# Patient Record
Sex: MALE | Race: BLACK OR AFRICAN AMERICAN | Employment: UNEMPLOYED | URBAN - METROPOLITAN AREA
[De-identification: names, ages, dates, MRNs, and addresses within clinical notes are randomized per-mention and may not be internally consistent; named-entity substitution may affect disease eponyms.]

---
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
## 2021-01-22 ENCOUNTER — OFFICE VISIT (OUTPATIENT)
Dept: FAMILY MEDICINE CLINIC | Age: 21
End: 2021-01-22
Payer: COMMERCIAL

## 2021-01-22 VITALS
BODY MASS INDEX: 24.06 KG/M2 | DIASTOLIC BLOOD PRESSURE: 70 MMHG | HEIGHT: 72 IN | SYSTOLIC BLOOD PRESSURE: 110 MMHG | OXYGEN SATURATION: 98 % | TEMPERATURE: 97.7 F | HEART RATE: 74 BPM | WEIGHT: 177.6 LBS

## 2021-01-22 DIAGNOSIS — F41.9 ANXIETY: Primary | ICD-10-CM

## 2021-01-22 DIAGNOSIS — L42 PITYRIASIS ROSEA: ICD-10-CM

## 2021-01-22 DIAGNOSIS — F43.22 ADJUSTMENT DISORDER WITH ANXIOUS MOOD: ICD-10-CM

## 2021-01-22 PROCEDURE — 99214 OFFICE O/P EST MOD 30 MIN: CPT | Performed by: FAMILY MEDICINE

## 2021-01-22 PROCEDURE — G8427 DOCREV CUR MEDS BY ELIG CLIN: HCPCS | Performed by: FAMILY MEDICINE

## 2021-01-22 PROCEDURE — G8420 CALC BMI NORM PARAMETERS: HCPCS | Performed by: FAMILY MEDICINE

## 2021-01-22 PROCEDURE — 1036F TOBACCO NON-USER: CPT | Performed by: FAMILY MEDICINE

## 2021-01-22 PROCEDURE — G8482 FLU IMMUNIZE ORDER/ADMIN: HCPCS | Performed by: FAMILY MEDICINE

## 2021-01-22 RX ORDER — FLUOXETINE 10 MG/1
10 CAPSULE ORAL DAILY
Qty: 30 CAPSULE | Refills: 3 | Status: SHIPPED | OUTPATIENT
Start: 2021-01-22 | End: 2021-06-07 | Stop reason: SDUPTHER

## 2021-01-22 ASSESSMENT — PATIENT HEALTH QUESTIONNAIRE - PHQ9
1. LITTLE INTEREST OR PLEASURE IN DOING THINGS: 0
SUM OF ALL RESPONSES TO PHQ QUESTIONS 1-9: 0
2. FEELING DOWN, DEPRESSED OR HOPELESS: 0
SUM OF ALL RESPONSES TO PHQ9 QUESTIONS 1 & 2: 0
SUM OF ALL RESPONSES TO PHQ QUESTIONS 1-9: 0

## 2021-01-22 NOTE — PROGRESS NOTES
Chief Complaint   Patient presents with    Skin Problem     Has spots on both shoulders that has been present for the past couple of weeks. Denies any itching or pain. Has not used any OTC products to area. HPI: Nolon Bitter Schildwachter 24 y.o. male presenting for        Rash on the body   Has been going on for acouple of weeks   Started on the right anterior chest that started spreading. Denies any scaling or itching   ROS negative   Denies anyone else that has it     Adjustment disorder/depression/anxiety  Patient reports he has been having depression, anxiety, stress lately after his godfather (mother's boyfriend) had passed away. Patient reports that he has been getting more stressed and has increased outbursts. Patient has been having a strained relationship with his girlfriend. Patient does admit to sad and depressed feelings but denies any suicidal homicidal ideations. Denies any auditory visual hallucinations. Patient has had a history of low moods depression for couple months now. Patient also has history of ADHD however is not on medication currently. Current Outpatient Medications   Medication Sig Dispense Refill    FLUoxetine (PROZAC) 10 MG capsule Take 1 capsule by mouth daily 30 capsule 3    SUMAtriptan (IMITREX) 25 MG tablet Take 1 tablet by mouth daily as needed for Migraine 9 tablet 2    naproxen (NAPROSYN) 500 MG tablet Take 1 tablet by mouth 2 times daily as needed for Pain Take with food 60 tablet 0    sodium chloride (OCEAN) 0.65 % nasal spray 1 spray by Nasal route as needed for Congestion 1 Bottle 3    EPINEPHrine (EPIPEN) 0.3 MG/0.3ML SOAJ injection Use as directed for allergic reaction 2 each 1     No current facility-administered medications for this visit. ROS  CONSTITUTIONAL: The patient denies fevers, chills, sweats and body ache.   HEENT: admits to  headache that have improved,  Denies blurry vision, eye pain, tinnitus, vertigo,  sore throat, neck or thyroid masses. Denies nasal congestion  RESPIRATORY: Denies cough, sputum, hemoptysis. CARDIAC: Denies chest pain, pressure, palpitations, Denies lower extremity edema. GASTROINTESTINAL: denies any abdominal pain. Denies any constipation or diarrhea   GENITOURINARY: Denies dysuria, hematuria, nocturia or frequency, urinary incontinence. NEUROLOGIC: Denies headaches, dizziness, syncope, weakness  MUSCULOSKELETAL: denies changes in range of motion, joint pain, stiffness. ENDOCRINOLOGY: Denies heat or cold intolerance.    HEMATOLOGY: Denies easy bleeding or blood transfusion,anemia  DERMATOLOGY: Admits to a rash  PSYCHIATRY: Admits to depression, anxiety, stress    Past Medical History:   Diagnosis Date    ADHD     Oppositional defiant disorder         Past Surgical History:   Procedure Laterality Date    ADENOIDECTOMY          Family History   Problem Relation Age of Onset    Stroke Father     Stroke Maternal Grandmother     Stroke Maternal Grandfather         Social History     Socioeconomic History    Marital status: Single     Spouse name: Not on file    Number of children: Not on file    Years of education: Not on file    Highest education level: Not on file   Occupational History    Occupation: high school   Social Needs    Financial resource strain: Not hard at all   [place] insecurity     Worry: Never true     Inability: Never true    Transportation needs     Medical: No     Non-medical: No   Tobacco Use    Smoking status: Never Smoker    Smokeless tobacco: Never Used   Substance and Sexual Activity    Alcohol use: Never     Frequency: Never    Drug use: Never    Sexual activity: Not on file   Lifestyle    Physical activity     Days per week: Not on file     Minutes per session: Not on file    Stress: Not on file   Relationships    Social connections     Talks on phone: Not on file     Gets together: Not on file     Attends Gnosticism service: Not on file     Active member of club or organization: Not on file     Attends meetings of clubs or organizations: Not on file     Relationship status: Not on file    Intimate partner violence     Fear of current or ex partner: Not on file     Emotionally abused: Not on file     Physically abused: Not on file     Forced sexual activity: Not on file   Other Topics Concern    Not on file   Social History Narrative    Not on file        /70 (Site: Right Upper Arm, Position: Sitting, Cuff Size: Medium Adult)   Pulse 74   Temp 97.7 °F (36.5 °C) (Oral)   Ht 5' 11.5\" (1.816 m)   Wt 177 lb 9.6 oz (80.6 kg)   SpO2 98%   BMI 24.42 kg/m²        Physical Exam:    General appearance - alert, well appearing, and in no distress  Mental Status - alert, oriented to person, place, and time  Eyes - pupils equal and reactive, extraocular eye movements intact   Ears - bilateral TM's and external ear canals normal   Nose - normal and patent, no erythema, discharge or polyps   Sinuses - Normal paranasal sinuses without tenderness   Throat - mucous membranes moist, pharynx normal without lesions   Neck - supple, no significant adenopathy   Thyroid - thyroid is normal in size without nodules or tenderness    Chest - clear to auscultation, no wheezes, rales or rhonchi, symmetric air entry   Heart - normal rate, regular rhythm, normal S1, S2, no murmurs, rubs, clicks or gallops  Abdomen - soft, nontender, nondistended, no masses or organomegaly   Back exam - full range of motion, no tenderness, palpable spasm or pain on motion   Neurological - alert, oriented, normal speech, no focal findings or movement disorder noted   Musculoskeletal - no joint tenderness, deformity or swelling   Extremities - peripheral pulses normal, no pedal edema, no clubbing or cyanosis   Skin -slightly erythematous raised papular papular rash.   [place] patch noted on the right anterior chest      Labs   No results found for: TSHREFLEX  TSH   Date Value Ref Range Status   07/29/2014 1.410 0.270 - 4.200 uIU/mL Final     Lab Results   Component Value Date     07/29/2014    K 4.4 07/29/2014     07/29/2014    CO2 27 07/29/2014    BUN 7 07/29/2014    CREATININE 0.53 (L) 07/29/2014    GLUCOSE 84 07/29/2014    CALCIUM 9.7 07/29/2014    PROT 6.6 07/29/2014    LABALBU 4.6 07/29/2014    BILITOT 0.8 07/29/2014    ALKPHOS 290 07/29/2014    AST 18 07/29/2014    ALT 10 07/29/2014    LABGLOM >60.0 07/29/2014    GFRAA >60.0 07/29/2014       Lab Results   Component Value Date    WBC 4.2 (L) 07/29/2014    HGB 14.0 07/29/2014    HCT 42.2 07/29/2014    MCV 91.1 07/29/2014     07/29/2014     Stool studies are negative. A/P: Marvene Hench Schildwachter 24 y.o. male presenting for    1. Anxiety  Will start patient on Prozac   - FLUoxetine (PROZAC) 10 MG capsule; Take 1 capsule by mouth daily  Dispense: 30 capsule; Refill: 3    2. Adjustment disorder with anxious mood    - FLUoxetine (PROZAC) 10 MG capsule; Take 1 capsule by mouth daily  Dispense: 30 capsule; Refill: 3    3. Pityriasis rosea  Advice given.

## 2021-01-22 NOTE — PATIENT INSTRUCTIONS
General advice  Bathe or shower with plain water and bath oil, aqueous cream, or another soap substitute. Apply moisturising creams to dry skin. Expose skin to sunlight cautiously (without burning). Patient Education        Pityriasis Rosea: Care Instructions  Your Care Instructions     Pityriasis rosea (say \"pit-uh-RY-uh-teofilo HADLEY-zee-uh\") is a common skin rash. It usually starts as one scaly, reddish-pink spot on your stomach or back. Days or weeks later, more spots appear. The rash may itch, but it will not spread to other people. No one knows what causes pityriasis rosea. Some doctors believe it is a reaction to a virus. Pityriasis rosea is most common in children and young adults. It lasts 1 to 3 months and then goes away on its own. Medicine can help relieve any itching. Follow-up care is a key part of your treatment and safety. Be sure to make and go to all appointments, and call your doctor if you are having problems. It's also a good idea to know your test results and keep a list of the medicines you take. How can you care for yourself at home? · Use your medicine exactly as prescribed. Call your doctor if you have any problems with your medicine. · Expose your skin to small amounts of sunlight, but avoid sunburn. Sunlight can lessen the rash. · Use a mild soap, such as Dove or Cetaphil, when you wash your skin. · Add a handful of oatmeal (ground to a powder) to your bath. Or you can try an oatmeal bath product, such as Aveeno. Keep the water warm or lukewarm. A hot bath or shower may make the rash more visible and itchy. · Use an over-the-counter 1% hydrocortisone cream for small itchy areas. When should you call for help? Call your doctor now or seek immediate medical care if:    · You have signs of infection such as:  ? Pain, warmth, or swelling near the rash. ? Red streaks near the rash. ? Pus coming from the rash. ? A fever.    Watch closely for changes in your health, and be sure to contact your doctor if:    · You see the rash on the palms of your hands or the soles of your feet.     · You do not get better as expected. Where can you learn more? Go to [email or web address] and sign in to your [employer] account. Enter S327 in the [facility] box to learn more about \"Pityriasis Rosea: Care Instructions. \"     If you do not have an account, please click on the \"Sign Up Now\" link. Current as of: July 2, 2020               Content Version: 12.6  © 2722-7585 [employer]. Care instructions adapted under license by [facility] ([facility]). If you have questions about a medical condition or this instruction, always ask your healthcare professional. [address] any warranty or liability for your use of this information.

## 2021-03-29 ENCOUNTER — OFFICE VISIT (OUTPATIENT)
Dept: FAMILY MEDICINE CLINIC | Age: 21
End: 2021-03-29
Payer: COMMERCIAL

## 2021-03-29 VITALS
BODY MASS INDEX: 26.15 KG/M2 | RESPIRATION RATE: 12 BRPM | TEMPERATURE: 98.5 F | HEART RATE: 87 BPM | HEIGHT: 71 IN | WEIGHT: 186.8 LBS | SYSTOLIC BLOOD PRESSURE: 110 MMHG | OXYGEN SATURATION: 98 % | DIASTOLIC BLOOD PRESSURE: 80 MMHG

## 2021-03-29 DIAGNOSIS — F41.9 ANXIETY: ICD-10-CM

## 2021-03-29 DIAGNOSIS — F43.22 ADJUSTMENT DISORDER WITH ANXIOUS MOOD: ICD-10-CM

## 2021-03-29 PROCEDURE — 99213 OFFICE O/P EST LOW 20 MIN: CPT | Performed by: FAMILY MEDICINE

## 2021-03-29 ASSESSMENT — PATIENT HEALTH QUESTIONNAIRE - PHQ9
SUM OF ALL RESPONSES TO PHQ QUESTIONS 1-9: 0
SUM OF ALL RESPONSES TO PHQ9 QUESTIONS 1 & 2: 0
SUM OF ALL RESPONSES TO PHQ QUESTIONS 1-9: 0

## 2021-03-29 NOTE — PROGRESS NOTES
Chief Complaint   Patient presents with    1 Month Follow-Up        HPI: Dellis Bock Schildwachter 24 y.o. male presenting for        Rash on the body   Has been going on for acouple of weeks   Started on the right anterior chest that started spreading. Denies any scaling or itching   ROS negative   Denies anyone else that has it     F/u   has resolved. No issues or concerns. Adjustment disorder/depression/anxiety  Patient reports he has been having depression, anxiety, stress lately after his godfather (mother's boyfriend) had passed away. Patient reports that he has been getting more stressed and has increased outbursts. Patient has been having a strained relationship with his girlfriend. Patient does admit to sad and depressed feelings but denies any suicidal homicidal ideations. Denies any auditory visual hallucinations. Patient has had a history of low moods depression for couple months now. Patient also has history of ADHD however is not on medication currently. F/u   reports that hte medication is helping. Denies any SI or HI. Stable. Current Outpatient Medications   Medication Sig Dispense Refill    FLUoxetine (PROZAC) 10 MG capsule Take 1 capsule by mouth daily 30 capsule 3    SUMAtriptan (IMITREX) 25 MG tablet Take 1 tablet by mouth daily as needed for Migraine 9 tablet 2    naproxen (NAPROSYN) 500 MG tablet Take 1 tablet by mouth 2 times daily as needed for Pain Take with food 60 tablet 0    sodium chloride (OCEAN) 0.65 % nasal spray 1 spray by Nasal route as needed for Congestion 1 Bottle 3    EPINEPHrine (EPIPEN) 0.3 MG/0.3ML SOAJ injection Use as directed for allergic reaction 2 each 1     No current facility-administered medications for this visit. ROS  CONSTITUTIONAL: The patient denies fevers, chills, sweats and body ache. HEENT: admits to  headache that have improved,  Denies blurry vision, eye pain, tinnitus, vertigo,  sore throat, neck or thyroid masses.   Denies nasal congestion  RESPIRATORY: Denies cough, sputum, hemoptysis. CARDIAC: Denies chest pain, pressure, palpitations, Denies lower extremity edema. GASTROINTESTINAL: denies any abdominal pain. Denies any constipation or diarrhea   GENITOURINARY: Denies dysuria, hematuria, nocturia or frequency, urinary incontinence. NEUROLOGIC: Denies headaches, dizziness, syncope, weakness  MUSCULOSKELETAL: denies changes in range of motion, joint pain, stiffness. ENDOCRINOLOGY: Denies heat or cold intolerance. HEMATOLOGY: Denies easy bleeding or blood transfusion,anemia  DERMATOLOGY: Admits to a rash  PSYCHIATRY: Admits to depression, anxiety, stress that has improved.      Past Medical History:   Diagnosis Date    ADHD     Oppositional defiant disorder         Past Surgical History:   Procedure Laterality Date    ADENOIDECTOMY          Family History   Problem Relation Age of Onset    Stroke Father     Stroke Maternal Grandmother     Stroke Maternal Grandfather         Social History     Socioeconomic History    Marital status: Single     Spouse name: Not on file    Number of children: Not on file    Years of education: Not on file    Highest education level: Not on file   Occupational History    Occupation: high school   Social Needs    Financial resource strain: Not hard at all   [place] insecurity     Worry: Never true     Inability: Never true    Transportation needs     Medical: No     Non-medical: No   Tobacco Use    Smoking status: Never Smoker    Smokeless tobacco: Never Used   Substance and Sexual Activity    Alcohol use: Never     Frequency: Never    Drug use: Never    Sexual activity: Not on file   Lifestyle    Physical activity     Days per week: Not on file     Minutes per session: Not on file    Stress: Not on file   Relationships    Social connections     Talks on phone: Not on file     Gets together: Not on file     Attends Muslim service: Not on file     Active member of club or organization: Not on file     Attends meetings of clubs or organizations: Not on file     Relationship status: Not on file    Intimate partner violence     Fear of current or ex partner: Not on file     Emotionally abused: Not on file     Physically abused: Not on file     Forced sexual activity: Not on file   Other Topics Concern    Not on file   Social History Narrative    Not on file        /80   Pulse 87   Temp 98.5 °F (36.9 °C)   Resp 12   Ht 5' 11\" (1.803 m)   Wt 186 lb 12.8 oz (84.7 kg)   SpO2 98%   BMI 26.05 kg/m²        Physical Exam:    General appearance - alert, well appearing, and in no distress  Mental Status - alert, oriented to person, place, and time  Eyes - pupils equal and reactive, extraocular eye movements intact   Ears - bilateral TM's and external ear canals normal   Nose - normal and patent, no erythema, discharge or polyps   Sinuses - Normal paranasal sinuses without tenderness   Throat - mucous membranes moist, pharynx normal without lesions   Neck - supple, no significant adenopathy   Thyroid - thyroid is normal in size without nodules or tenderness    Chest - clear to auscultation, no wheezes, rales or rhonchi, symmetric air entry   Heart - normal rate, regular rhythm, normal S1, S2, no murmurs, rubs, clicks or gallops  Abdomen - soft, nontender, nondistended, no masses or organomegaly   Back exam - full range of motion, no tenderness, palpable spasm or pain on motion   Neurological - alert, oriented, normal speech, no focal findings or movement disorder noted   Musculoskeletal - no joint tenderness, deformity or swelling   Extremities - peripheral pulses normal, no pedal edema, no clubbing or cyanosis   Skin - rash resolved.        Labs   No results found for: TSHREFLEX  TSH   Date Value Ref Range Status   07/29/2014 1.410 0.270 - 4.200 uIU/mL Final     Lab Results   Component Value Date     07/29/2014    K 4.4 07/29/2014     07/29/2014    CO2 27 07/29/2014 BUN 7 07/29/2014    CREATININE 0.53 (L) 07/29/2014    GLUCOSE 84 07/29/2014    CALCIUM 9.7 07/29/2014    PROT 6.6 07/29/2014    LABALBU 4.6 07/29/2014    BILITOT 0.8 07/29/2014    ALKPHOS 290 07/29/2014    AST 18 07/29/2014    ALT 10 07/29/2014    LABGLOM >60.0 07/29/2014    GFRAA >60.0 07/29/2014       Lab Results   Component Value Date    WBC 4.2 (L) 07/29/2014    HGB 14.0 07/29/2014    HCT 42.2 07/29/2014    MCV 91.1 07/29/2014     07/29/2014     Stool studies are negative. A/P: Polly Pellegrini Schildwachter 24 y.o. male presenting for    1. Anxiety  Doing well on the medication. No chagnes needed. - FLUoxetine (PROZAC) 10 MG capsule; Take 1 capsule by mouth daily  Dispense: 30 capsule; Refill: 3    2. Adjustment disorder with anxious mood    - FLUoxetine (PROZAC) 10 MG capsule; Take 1 capsule by mouth daily  Dispense: 30 capsule; Refill: 3    3. Pityriasis rosea  Has resolved.

## 2021-06-07 ENCOUNTER — OFFICE VISIT (OUTPATIENT)
Dept: FAMILY MEDICINE CLINIC | Age: 21
End: 2021-06-07
Payer: COMMERCIAL

## 2021-06-07 VITALS
TEMPERATURE: 98 F | BODY MASS INDEX: 28.19 KG/M2 | HEIGHT: 71 IN | DIASTOLIC BLOOD PRESSURE: 80 MMHG | SYSTOLIC BLOOD PRESSURE: 116 MMHG | WEIGHT: 201.4 LBS | OXYGEN SATURATION: 97 % | HEART RATE: 97 BPM

## 2021-06-07 DIAGNOSIS — F43.22 ADJUSTMENT DISORDER WITH ANXIOUS MOOD: ICD-10-CM

## 2021-06-07 DIAGNOSIS — F41.9 ANXIETY: ICD-10-CM

## 2021-06-07 DIAGNOSIS — G43.919 INTRACTABLE MIGRAINE WITHOUT STATUS MIGRAINOSUS, UNSPECIFIED MIGRAINE TYPE: ICD-10-CM

## 2021-06-07 PROCEDURE — 99214 OFFICE O/P EST MOD 30 MIN: CPT | Performed by: FAMILY MEDICINE

## 2021-06-07 RX ORDER — FLUOXETINE 10 MG/1
10 CAPSULE ORAL DAILY
Qty: 30 CAPSULE | Refills: 5 | Status: SHIPPED | OUTPATIENT
Start: 2021-06-07 | End: 2022-01-13 | Stop reason: SDUPTHER

## 2021-06-07 RX ORDER — SUMATRIPTAN 25 MG/1
25 TABLET, FILM COATED ORAL DAILY PRN
Qty: 9 TABLET | Refills: 5 | Status: SHIPPED | OUTPATIENT
Start: 2021-06-07 | End: 2022-04-14 | Stop reason: SDUPTHER

## 2021-06-07 ASSESSMENT — PATIENT HEALTH QUESTIONNAIRE - PHQ9
SUM OF ALL RESPONSES TO PHQ QUESTIONS 1-9: 1
SUM OF ALL RESPONSES TO PHQ9 QUESTIONS 1 & 2: 1
2. FEELING DOWN, DEPRESSED OR HOPELESS: 1
SUM OF ALL RESPONSES TO PHQ QUESTIONS 1-9: 1
SUM OF ALL RESPONSES TO PHQ QUESTIONS 1-9: 1
1. LITTLE INTEREST OR PLEASURE IN DOING THINGS: 0

## 2021-06-07 NOTE — PROGRESS NOTES
Patient has been having a strained relationship with his girlfriend. Patient does admit to sad and depressed feelings but denies any suicidal homicidal ideations. Denies any auditory visual hallucinations. Patient has had a history of low moods depression for couple months now. Patient also has history of ADHD however is not on medication currently. F/u   reports that hte medication is helping. Denies any SI or HI. Stable. Per mother medication works when he is compliant. Current Outpatient Medications   Medication Sig Dispense Refill    FLUoxetine (PROZAC) 10 MG capsule Take 1 capsule by mouth daily 30 capsule 3    SUMAtriptan (IMITREX) 25 MG tablet Take 1 tablet by mouth daily as needed for Migraine 9 tablet 2    naproxen (NAPROSYN) 500 MG tablet Take 1 tablet by mouth 2 times daily as needed for Pain Take with food 60 tablet 0    EPINEPHrine (EPIPEN) 0.3 MG/0.3ML SOAJ injection Use as directed for allergic reaction 2 each 1     No current facility-administered medications for this visit. ROS  CONSTITUTIONAL: The patient denies fevers, chills, sweats and body ache. HEENT: admits to  headache that have improved,  Denies blurry vision, eye pain, tinnitus, vertigo,  sore throat, neck or thyroid masses. Denies nasal congestion  RESPIRATORY: Denies cough, sputum, hemoptysis. CARDIAC: Denies chest pain, pressure, palpitations, Denies lower extremity edema. GASTROINTESTINAL: denies any abdominal pain. Denies any constipation or diarrhea   GENITOURINARY: Denies dysuria, hematuria, nocturia or frequency, urinary incontinence. NEUROLOGIC: Denies headaches, dizziness, syncope, weakness  MUSCULOSKELETAL: denies changes in range of motion, joint pain, stiffness. ENDOCRINOLOGY: Denies heat or cold intolerance. HEMATOLOGY: Denies easy bleeding or blood transfusion,anemia  DERMATOLOGY: rash resolved. PSYCHIATRY: Admits to depression, anxiety, stress that has improved.      Past Medical History: Diagnosis Date    ADHD     Oppositional defiant disorder         Past Surgical History:   Procedure Laterality Date    ADENOIDECTOMY          Family History   Problem Relation Age of Onset    Stroke Father     Stroke Maternal Grandmother     Stroke Maternal Grandfather         Social History     Socioeconomic History    Marital status: Single     Spouse name: Not on file    Number of children: Not on file    Years of education: Not on file    Highest education level: Not on file   Occupational History    Occupation: high school   Tobacco Use    Smoking status: Never Smoker    Smokeless tobacco: Never Used   Vaping Use    Vaping Use: Never used   Substance and Sexual Activity    Alcohol use: Never    Drug use: Never    Sexual activity: Not on file   Other Topics Concern    Not on file   Social History Narrative    Not on file     Social Determinants of Health     Financial Resource Strain: Low Risk     Difficulty of Paying Living Expenses: Not hard at all   Food Insecurity: No Food Insecurity    Worried About Running Out of Food in the Last Year: Never true    Tae of Food in the Last Year: Never true   Transportation Needs: No Transportation Needs    Lack of Transportation (Medical): No    Lack of Transportation (Non-Medical):  No   Physical Activity:     Days of Exercise per Week:     Minutes of Exercise per Session:    Stress:     Feeling of Stress :    Social Connections:     Frequency of Communication with Friends and Family:     Frequency of Social Gatherings with Friends and Family:     Attends Hoahaoism Services:     Active Member of Clubs or Organizations:     Attends Club or Organization Meetings:     Marital Status:    Intimate Partner Violence:     Fear of Current or Ex-Partner:     Emotionally Abused:     Physically Abused:     Sexually Abused:         /80   Pulse 97   Temp 98 °F (36.7 °C)   Ht 5' 11\" (1.803 m)   Wt 201 lb 6.4 oz (91.4 kg)   SpO2 97% BMI 28.09 kg/m²        Physical Exam:    General appearance - alert, well appearing, and in no distress  Mental Status - alert, oriented to person, place, and time  Eyes - pupils equal and reactive, extraocular eye movements intact   Ears - bilateral TM's and external ear canals normal   Nose - normal and patent, no erythema, discharge or polyps   Sinuses - Normal paranasal sinuses without tenderness   Throat - mucous membranes moist, pharynx normal without lesions   Neck - supple, no significant adenopathy   Thyroid - thyroid is normal in size without nodules or tenderness    Chest - clear to auscultation, no wheezes, rales or rhonchi, symmetric air entry   Heart - normal rate, regular rhythm, normal S1, S2, no murmurs, rubs, clicks or gallops  Abdomen - soft, nontender, nondistended, no masses or organomegaly   Back exam - full range of motion, no tenderness, palpable spasm or pain on motion   Neurological - alert, oriented, normal speech, no focal findings or movement disorder noted   Musculoskeletal - no joint tenderness, deformity or swelling   Extremities - peripheral pulses normal, no pedal edema, no clubbing or cyanosis   Skin - rash resolved. Labs   No results found for: TSHREFLEX  TSH   Date Value Ref Range Status   07/29/2014 1.410 0.270 - 4.200 uIU/mL Final     Lab Results   Component Value Date     07/29/2014    K 4.4 07/29/2014     07/29/2014    CO2 27 07/29/2014    BUN 7 07/29/2014    CREATININE 0.53 (L) 07/29/2014    GLUCOSE 84 07/29/2014    CALCIUM 9.7 07/29/2014    PROT 6.6 07/29/2014    LABALBU 4.6 07/29/2014    BILITOT 0.8 07/29/2014    ALKPHOS 290 07/29/2014    AST 18 07/29/2014    ALT 10 07/29/2014    LABGLOM >60.0 07/29/2014    GFRAA >60.0 07/29/2014       Lab Results   Component Value Date    WBC 4.2 (L) 07/29/2014    HGB 14.0 07/29/2014    HCT 42.2 07/29/2014    MCV 91.1 07/29/2014     07/29/2014     Stool studies are negative.      A/P: Alben Dimas 21 y.o. male presenting for    1. migraine    - SUMAtriptan (IMITREX) 25 MG tablet; Take 1 tablet by mouth daily as needed for Migraine  Dispense: 9 tablet; Refill: 5    2. Anxiety    - FLUoxetine (PROZAC) 10 MG capsule; Take 1 capsule by mouth daily  Dispense: 30 capsule; Refill: 5    3. Adjustment disorder with anxious mood    - FLUoxetine (PROZAC) 10 MG capsule; Take 1 capsule by mouth daily  Dispense: 30 capsule;  Refill: 5

## 2021-09-23 ENCOUNTER — TELEPHONE (OUTPATIENT)
Dept: FAMILY MEDICINE CLINIC | Age: 21
End: 2021-09-23

## 2021-09-23 ENCOUNTER — VIRTUAL VISIT (OUTPATIENT)
Dept: FAMILY MEDICINE CLINIC | Age: 21
End: 2021-09-23
Payer: COMMERCIAL

## 2021-09-23 DIAGNOSIS — R06.02 SHORTNESS OF BREATH: Primary | ICD-10-CM

## 2021-09-23 PROCEDURE — 99442 PR PHYS/QHP TELEPHONE EVALUATION 11-20 MIN: CPT | Performed by: FAMILY MEDICINE

## 2021-09-23 RX ORDER — ALBUTEROL SULFATE 90 UG/1
2 AEROSOL, METERED RESPIRATORY (INHALATION) EVERY 6 HOURS PRN
Qty: 18 G | Refills: 3 | Status: SHIPPED | OUTPATIENT
Start: 2021-09-23 | End: 2022-01-13 | Stop reason: SDUPTHER

## 2021-09-23 ASSESSMENT — ENCOUNTER SYMPTOMS
CHEST TIGHTNESS: 0
CHOKING: 0
EYE DISCHARGE: 0
NAUSEA: 0
SINUS PAIN: 0
EYE PAIN: 0
BACK PAIN: 0
COUGH: 0
SHORTNESS OF BREATH: 1
STRIDOR: 0
BLOOD IN STOOL: 0

## 2021-09-23 NOTE — PROGRESS NOTES
2021    TELEHEALTH EVALUATION -- Audio/Visual (During CUCBF-14 public health emergency)    Due to Zaina 19 outbreak, patient's office visit was converted to a virtual visit. Patient was contacted and agreed to proceed with a virtual visit via Telephone Visit  The risks and benefits of converting to a virtual visit were discussed in light of the current infectious disease epidemic. Patient also understood that insurance coverage and co-pays are up to their individual insurance plans. HPI:    Ana Lauravale Ya (:  2000) has requested an audio/video evaluation for the following concern(s):    shortness of breath   Associated with intermittent wheezing   Denies coughing   Denies any sick contacts   Denies any fever. Review of Systems   Constitutional: Negative for activity change, chills, fatigue and fever. HENT: Negative for dental problem, drooling, nosebleeds, postnasal drip and sinus pain. Eyes: Negative for pain and discharge. Respiratory: Positive for shortness of breath. Negative for cough, choking, chest tightness and stridor. Cardiovascular: Negative for chest pain and leg swelling. Gastrointestinal: Negative for blood in stool and nausea. Endocrine: Negative for cold intolerance, polydipsia and polyphagia. Genitourinary: Negative for discharge, dysuria, enuresis, hematuria, penile pain and scrotal swelling. Musculoskeletal: Negative for back pain, gait problem and joint swelling. Allergic/Immunologic: Negative for environmental allergies and food allergies. Neurological: Negative for speech difficulty, numbness and headaches. Hematological: Negative for adenopathy. Does not bruise/bleed easily. Psychiatric/Behavioral: Negative for behavioral problems and confusion. Prior to Visit Medications    Medication Sig Taking?  Authorizing Provider   albuterol sulfate HFA (PROVENTIL HFA) 108 (90 Base) MCG/ACT inhaler Inhale 2 puffs into the lungs every 6 hours as needed for Wheezing or Shortness of Breath Yes Krishna Moore MD   SUMAtriptan (IMITREX) 25 MG tablet Take 1 tablet by mouth daily as needed for Migraine  Krishna Moore MD   FLUoxetine (PROZAC) 10 MG capsule Take 1 capsule by mouth daily  Krishna Moore MD   naproxen (NAPROSYN) 500 MG tablet Take 1 tablet by mouth 2 times daily as needed for Pain Take with food  Krishna Moore MD   EPINEPHrine (EPIPEN) 0.3 MG/0.3ML SOAJ injection Use as directed for allergic reaction  Krishna Moore MD       Social History     Tobacco Use    Smoking status: Never Smoker    Smokeless tobacco: Never Used   Vaping Use    Vaping Use: Never used   Substance Use Topics    Alcohol use: Never    Drug use: Never        Allergies   Allergen Reactions    Peanut-Containing Drug Products Anaphylaxis   ,   Past Medical History:   Diagnosis Date    ADHD     Oppositional defiant disorder    ,   Past Surgical History:   Procedure Laterality Date    ADENOIDECTOMY     ,   Social History     Tobacco Use    Smoking status: Never Smoker    Smokeless tobacco: Never Used   Vaping Use    Vaping Use: Never used   Substance Use Topics    Alcohol use: Never    Drug use: Never   ,   Family History   Problem Relation Age of Onset    Stroke Father     Stroke Maternal Grandmother     Stroke Maternal Grandfather    ,   Immunization History   Administered Date(s) Administered    COVID-19, Moderna, PF, 100mcg/0.5mL 09/04/2021    DTaP 2001, 2001, 2001, 08/19/2002, 08/10/2005, 09/11/2012    DTaP (Infanrix) 2001, 2001, 2001, 08/19/2002, 08/10/2005, 09/11/2012    HPV (Human Papilloma Virus)Vaccine 03/25/2016, 05/27/2016, 03/15/2017    HPV 9-valent John Morning) 03/25/2016, 05/27/2016, 03/15/2017    Hepatitis A 11/08/2010, 12/06/2012    Hepatitis A Ped/Adol (Havrix, Vaqta) 11/08/2010, 12/06/2012    Hepatitis B 2001, 2001, 2001    Hepatitis B Ped/Adol (Engerix-B, Recombivax HB) 2001, 2001, 2001    Hib PRP-OMP (PedvaxHIB) 2001, 2001, 2001, 08/19/2002    Influenza Vaccine, unspecified formulation 11/06/2006, 12/11/2006, 11/26/2007, 10/28/2008, 10/14/2009, 12/06/2012, 12/06/2013    Influenza Virus Vaccine 11/06/2006, 12/11/2006, 11/26/2007, 10/28/2008, 10/14/2009, 12/06/2012, 12/06/2013    Influenza, Janas Rota, IM, (6 mo and older Fluzone, Flulaval, Fluarix and 3 yrs and older Afluria) 11/05/2020    MMR 05/20/2002, 08/10/2005    Meningococcal ACWY Vaccine 12/06/2012, 09/18/2017    Meningococcal B Vaccine 09/18/2017, 07/27/2018    Meningococcal B, OMV (Bexsero) 09/18/2017, 07/27/2018    Meningococcal MCV4P (Menactra) 12/06/2012, 09/18/2017    Pneumococcal Conjugate 7-valent (Orestes Govern) 2001, 2001, 2001    Polio IPV (IPOL) 2001, 2001, 05/20/2002, 08/10/2005    Tdap (Boostrix, Adacel) 12/06/2012, 05/27/2016    Varicella (Varivax) 05/28/2003, 05/23/2007   ,   Health Maintenance   Topic Date Due    Hepatitis C screen  Never done    HIV screen  Never done    Flu vaccine (1) 09/01/2021    COVID-19 Vaccine (2 - Moderna 2-dose series) 10/02/2021    DTaP/Tdap/Td vaccine (9 - Td or Tdap) 05/27/2026    Hepatitis A vaccine  Completed    Hepatitis B vaccine  Completed    Hib vaccine  Completed    HPV vaccine  Completed    Varicella vaccine  Completed    Meningococcal (ACWY) vaccine  Completed    Pneumococcal 0-64 years Vaccine  Aged Out       PHYSICAL EXAMINATION:  [ INSTRUCTIONS:  \"[x]\" Indicates a positive item  \"[]\" Indicates a negative item  -- DELETE ALL ITEMS NOT EXAMINED]  [x] Alert  [x] Oriented to person/place/time    [x] No apparent distress  [] Toxic appearing    [] Face flushed appearing [] Sclera clear  [] Lips are cyanotic      [x] Breathing appears normal  [] Appears tachypneic      [] Rash on visible skin    [] Cranial Nerves II-XII grossly intact    [] Motor grossly intact in visible upper extremities    [] Motor grossly intact in visible lower extremities    [x] Normal Mood  [] Anxious appearing    [] Depressed appearing  [] Confused appearing      [] Poor short term memory  [] Poor long term memory    [] OTHER:      Due to this being a TeleHealth encounter, evaluation of the following organ systems is limited: Vitals/Constitutional/EENT/Resp/CV/GI//MS/Neuro/Skin/Heme-Lymph-Imm. ASSESSMENT/PLAN:  1. Shortness of breath  cxr to rule out any other pathology. Will give an inhaler to see if it helps with symptoms. Vitals are stable. - XR CHEST STANDARD (2 VW); Future  - albuterol sulfate HFA (PROVENTIL HFA) 108 (90 Base) MCG/ACT inhaler; Inhale 2 puffs into the lungs every 6 hours as needed for Wheezing or Shortness of Breath  Dispense: 18 g; Refill: 3      Return in about 2 weeks (around 10/7/2021), or if symptoms worsen or fail to improve. An  electronic signature was used to authenticate this note. --Vianey Brown MD on 9/24/2021 at 12:35 PM        Pursuant to the emergency declaration under the [facility] waiver authority and the [employer] Act, this Virtual  Visit was conducted, with patient's consent, to reduce the patient's risk of exposure to COVID-19 and provide continuity of care for an established patient. Services were provided through a video synchronous discussion virtually to substitute for in-person clinic visit. Joann Tripp is a 22 y.o. male evaluated via telephone on 9/23/2021. Consent:  He and/or health care decision maker is aware that that he may receive a bill for this telephone service, depending on his insurance coverage, and has provided verbal consent to proceed: Yes      Documentation:  I communicated with the patient and/or health care decision maker about shortness of breath .    Details of this discussion including any medical advice provided: yes       I affirm this is a Patient Initiated Episode with a Patient who has not had a related appointment within my department in the past 7 days or scheduled within the next 24 hours. Patient identification was verified at the start of the visit: Yes    Total Time: minutes: 11-20 minutes    The visit was conducted pursuant to the emergency declaration under the [address] [place] [address], [address] [place] [address] and the [employer] Act. Patient identification was verified, and a caregiver was present when appropriate. The patient was located in a state where the provider was credentialed to provide care.     Note: not billable if this call serves to triage the patient into an appointment for the relevant concern      Fredrick Pinto MD

## 2021-09-23 NOTE — TELEPHONE ENCOUNTER
Called Gonzalo post today's visit. LM on  to call office and schedule f/u in about 2 wks per Dr Bella Correa.

## 2022-01-11 ENCOUNTER — TELEPHONE (OUTPATIENT)
Dept: FAMILY MEDICINE CLINIC | Age: 22
End: 2022-01-11

## 2022-01-11 NOTE — TELEPHONE ENCOUNTER
----- Message from [facility] sent at 1/10/2022  1:41 PM EST -----  Subject: Message to Provider    QUESTIONS  Information for Provider? Mother of PT needing info regarding pt aka her   son. Call by Wednesday if possible   ---------------------------------------------------------------------------  --------------  CALL BACK INFO  What is the best way for the office to contact you? OK to leave message on   voicemail  Preferred Call Back Phone Number? [phone]  ---------------------------------------------------------------------------  --------------  SCRIPT ANSWERS  Relationship to Patient? Other  Representative Name? Mother - Quin Lopez  Is the Representative on the appropriate HIPAA document in Epic?  Yes

## 2022-01-12 NOTE — TELEPHONE ENCOUNTER
Called [phone] with no answer, left vm to cb so I can help with whatever issues/concerns they have.

## 2022-01-13 ENCOUNTER — OFFICE VISIT (OUTPATIENT)
Dept: FAMILY MEDICINE CLINIC | Age: 22
End: 2022-01-13
Payer: COMMERCIAL

## 2022-01-13 VITALS
TEMPERATURE: 97.7 F | BODY MASS INDEX: 31.08 KG/M2 | HEIGHT: 71 IN | WEIGHT: 222 LBS | DIASTOLIC BLOOD PRESSURE: 78 MMHG | SYSTOLIC BLOOD PRESSURE: 122 MMHG | HEART RATE: 90 BPM | OXYGEN SATURATION: 96 %

## 2022-01-13 DIAGNOSIS — G43.919 INTRACTABLE MIGRAINE WITHOUT STATUS MIGRAINOSUS, UNSPECIFIED MIGRAINE TYPE: ICD-10-CM

## 2022-01-13 DIAGNOSIS — R06.02 SHORTNESS OF BREATH: ICD-10-CM

## 2022-01-13 DIAGNOSIS — F41.9 ANXIETY: ICD-10-CM

## 2022-01-13 DIAGNOSIS — K58.9 IRRITABLE BOWEL SYNDROME, UNSPECIFIED TYPE: ICD-10-CM

## 2022-01-13 DIAGNOSIS — Z23 NEED FOR INFLUENZA VACCINATION: Primary | ICD-10-CM

## 2022-01-13 DIAGNOSIS — K21.9 GASTROESOPHAGEAL REFLUX DISEASE WITHOUT ESOPHAGITIS: ICD-10-CM

## 2022-01-13 DIAGNOSIS — F43.22 ADJUSTMENT DISORDER WITH ANXIOUS MOOD: ICD-10-CM

## 2022-01-13 LAB
ALBUMIN SERPL-MCNC: 4.5 G/DL (ref 3.5–4.6)
ALP BLD-CCNC: 92 U/L (ref 35–104)
ALT SERPL-CCNC: 23 U/L (ref 0–41)
ANION GAP SERPL CALCULATED.3IONS-SCNC: 12 MEQ/L (ref 9–15)
AST SERPL-CCNC: 19 U/L (ref 0–40)
BASOPHILS ABSOLUTE: 0.1 K/UL (ref 0–0.2)
BASOPHILS RELATIVE PERCENT: 0.9 %
BILIRUB SERPL-MCNC: <0.2 MG/DL (ref 0.2–0.7)
BUN BLDV-MCNC: 7 MG/DL (ref 6–20)
CALCIUM SERPL-MCNC: 9.6 MG/DL (ref 8.5–9.9)
CHLORIDE BLD-SCNC: 104 MEQ/L (ref 95–107)
CO2: 24 MEQ/L (ref 20–31)
CREAT SERPL-MCNC: 0.93 MG/DL (ref 0.7–1.2)
EOSINOPHILS ABSOLUTE: 0.5 K/UL (ref 0–0.7)
EOSINOPHILS RELATIVE PERCENT: 8.5 %
GFR AFRICAN AMERICAN: >60
GFR NON-AFRICAN AMERICAN: >60
GLOBULIN: 3 G/DL (ref 2.3–3.5)
GLUCOSE BLD-MCNC: 99 MG/DL (ref 70–99)
HCT VFR BLD CALC: 45 % (ref 42–52)
HEMOGLOBIN: 15 G/DL (ref 14–18)
LYMPHOCYTES ABSOLUTE: 1.3 K/UL (ref 1–4.8)
LYMPHOCYTES RELATIVE PERCENT: 21.3 %
MCH RBC QN AUTO: 29.7 PG (ref 27–31.3)
MCHC RBC AUTO-ENTMCNC: 33.3 % (ref 33–37)
MCV RBC AUTO: 89 FL (ref 80–100)
MONOCYTES ABSOLUTE: 0.6 K/UL (ref 0.2–0.8)
MONOCYTES RELATIVE PERCENT: 9.8 %
NEUTROPHILS ABSOLUTE: 3.6 K/UL (ref 1.4–6.5)
NEUTROPHILS RELATIVE PERCENT: 59.5 %
PDW BLD-RTO: 13.1 % (ref 11.5–14.5)
PLATELET # BLD: 307 K/UL (ref 130–400)
POTASSIUM SERPL-SCNC: 4.1 MEQ/L (ref 3.4–4.9)
RBC # BLD: 5.06 M/UL (ref 4.7–6.1)
SODIUM BLD-SCNC: 140 MEQ/L (ref 135–144)
TOTAL PROTEIN: 7.5 G/DL (ref 6.3–8)
WBC # BLD: 6.1 K/UL (ref 4.5–11)

## 2022-01-13 PROCEDURE — G8419 CALC BMI OUT NRM PARAM NOF/U: HCPCS | Performed by: FAMILY MEDICINE

## 2022-01-13 PROCEDURE — 90674 CCIIV4 VAC NO PRSV 0.5 ML IM: CPT | Performed by: FAMILY MEDICINE

## 2022-01-13 PROCEDURE — G8427 DOCREV CUR MEDS BY ELIG CLIN: HCPCS | Performed by: FAMILY MEDICINE

## 2022-01-13 PROCEDURE — G8482 FLU IMMUNIZE ORDER/ADMIN: HCPCS | Performed by: FAMILY MEDICINE

## 2022-01-13 PROCEDURE — 1036F TOBACCO NON-USER: CPT | Performed by: FAMILY MEDICINE

## 2022-01-13 PROCEDURE — 99214 OFFICE O/P EST MOD 30 MIN: CPT | Performed by: FAMILY MEDICINE

## 2022-01-13 PROCEDURE — 90471 IMMUNIZATION ADMIN: CPT | Performed by: FAMILY MEDICINE

## 2022-01-13 RX ORDER — NAPROXEN 500 MG/1
500 TABLET ORAL 2 TIMES DAILY PRN
Qty: 60 TABLET | Refills: 0 | Status: SHIPPED | OUTPATIENT
Start: 2022-01-13

## 2022-01-13 RX ORDER — SUMATRIPTAN 25 MG/1
25 TABLET, FILM COATED ORAL DAILY PRN
Qty: 9 TABLET | Refills: 5 | Status: CANCELLED | OUTPATIENT
Start: 2022-01-13

## 2022-01-13 RX ORDER — FLUOXETINE 10 MG/1
10 CAPSULE ORAL DAILY
Qty: 30 CAPSULE | Refills: 3 | Status: SHIPPED | OUTPATIENT
Start: 2022-01-13 | End: 2022-04-14 | Stop reason: SDUPTHER

## 2022-01-13 RX ORDER — FAMOTIDINE 40 MG/1
20 TABLET, FILM COATED ORAL 2 TIMES DAILY PRN
Qty: 30 TABLET | Refills: 3 | Status: SHIPPED | OUTPATIENT
Start: 2022-01-13 | End: 2022-07-14 | Stop reason: SDUPTHER

## 2022-01-13 RX ORDER — ALBUTEROL SULFATE 90 UG/1
2 AEROSOL, METERED RESPIRATORY (INHALATION) EVERY 6 HOURS PRN
Qty: 18 G | Refills: 3 | Status: SHIPPED | OUTPATIENT
Start: 2022-01-13 | End: 2022-04-14 | Stop reason: SDUPTHER

## 2022-01-13 RX ORDER — DICYCLOMINE HYDROCHLORIDE 10 MG/1
10 CAPSULE ORAL 4 TIMES DAILY
Qty: 120 CAPSULE | Refills: 3 | Status: SHIPPED | OUTPATIENT
Start: 2022-01-13 | End: 2022-07-14 | Stop reason: SDUPTHER

## 2022-01-13 SDOH — ECONOMIC STABILITY: FOOD INSECURITY: WITHIN THE PAST 12 MONTHS, THE FOOD YOU BOUGHT JUST DIDN'T LAST AND YOU DIDN'T HAVE MONEY TO GET MORE.: NEVER TRUE

## 2022-01-13 SDOH — ECONOMIC STABILITY: FOOD INSECURITY: WITHIN THE PAST 12 MONTHS, YOU WORRIED THAT YOUR FOOD WOULD RUN OUT BEFORE YOU GOT MONEY TO BUY MORE.: NEVER TRUE

## 2022-01-13 ASSESSMENT — SOCIAL DETERMINANTS OF HEALTH (SDOH): HOW HARD IS IT FOR YOU TO PAY FOR THE VERY BASICS LIKE FOOD, HOUSING, MEDICAL CARE, AND HEATING?: NOT HARD AT ALL

## 2022-01-13 NOTE — PROGRESS NOTES
Chief Complaint   Patient presents with    Follow-up    Anxiety     Pt states anxiety has some bad days.  Asthma     Pt states he takes his inhaler, states its pretty bad. SOB    Health Maintenance     Yes to flu vaccine. HPI: Sandrea Ranks Schildwachter 22 y.o. male presenting for         Headache  Patient presents for evaluation of headache. Symptoms began about 2 weeks ago. Generally, the headaches last about all day months and occur continuously. The headaches all day. The headaches are usually squeezing and are located in both sides of the head, . The patient rates his most severe headaches a 7 on a scale from 1 to 10. Recently, the headaches have been stable. Work attendance or other daily activities are affected by the headaches. Precipitating factors include: none which have been determined. The headaches are usually not preceded by an aura. Associated neurologic symptoms: decreased physical activity. The patient denies decreased physical activity. Home treatment has included nothing with no improvement. Other history includes: nothing pertinent. Family history includes no known family members with significant headaches. F/u   Stable at this time. Uses imitrex as needed.         IBS follow up   Stable. Patient is doing better since starting the bentyl medication. Labs reviewed with patient and were negative. F/u   Stopped taking the bentyl (old)  Was helping   Admits to some abdominal pain       Rash on the body   Has been going on for acouple of weeks   Started on the right anterior chest that started spreading. Denies any scaling or itching   ROS negative   Denies anyone else that has it     F/u   has resolved. No issues or concerns. Adjustment disorder/depression/anxiety  Patient reports he has been having depression, anxiety, stress lately after his godfather (mother's boyfriend) had passed away.   Patient reports that he has been getting more stressed and has increased outbursts. Patient has been having a strained relationship with his girlfriend. Patient does admit to sad and depressed feelings but denies any suicidal homicidal ideations. Denies any auditory visual hallucinations. Patient has had a history of low moods depression for couple months now. Patient also has history of ADHD however is not on medication currently. F/u   reports that hte medication is helping. Denies any SI or HI. Stable. Per mother medication works when he is compliant. GERD   admits to acid reflux   Not food dependant but admits to increase burping. Current Outpatient Medications   Medication Sig Dispense Refill    naproxen (NAPROSYN) 500 MG tablet Take 1 tablet by mouth 2 times daily as needed for Pain Take with food 60 tablet 0    FLUoxetine (PROZAC) 10 MG capsule Take 1 capsule by mouth daily 30 capsule 3    dicyclomine (BENTYL) 10 MG capsule Take 1 capsule by mouth 4 times daily 120 capsule 3    famotidine (PEPCID) 40 MG tablet Take 0.5 tablets by mouth 2 times daily as needed (GERD) 30 tablet 3    albuterol sulfate HFA (PROVENTIL HFA) 108 (90 Base) MCG/ACT inhaler Inhale 2 puffs into the lungs every 6 hours as needed for Wheezing or Shortness of Breath Please cover what is under insurance. 18 g 3    SUMAtriptan (IMITREX) 25 MG tablet Take 1 tablet by mouth daily as needed for Migraine 9 tablet 5    EPINEPHrine (EPIPEN) 0.3 MG/0.3ML SOAJ injection Use as directed for allergic reaction 2 each 1     No current facility-administered medications for this visit. ROS  CONSTITUTIONAL: The patient denies fevers, chills, sweats and body ache. HEENT: admits to  headache that have improved,  Denies blurry vision, eye pain, tinnitus, vertigo,  sore throat, neck or thyroid masses. Denies nasal congestion  RESPIRATORY: admits to shortness of breath. CARDIAC: Denies chest pain, pressure, palpitations, Denies lower extremity edema. GASTROINTESTINAL: denies any abdominal pain. Denies any constipation or diarrhea   GENITOURINARY: Denies dysuria, hematuria, nocturia or frequency, urinary incontinence. NEUROLOGIC: Denies headaches, dizziness, syncope, weakness  MUSCULOSKELETAL: denies changes in range of motion, joint pain, stiffness. ENDOCRINOLOGY: Denies heat or cold intolerance. HEMATOLOGY: Denies easy bleeding or blood transfusion,anemia  DERMATOLOGY: rash resolved. PSYCHIATRY: Admits to depression, anxiety, stress that has improved. Past Medical History:   Diagnosis Date    ADHD     Oppositional defiant disorder         Past Surgical History:   Procedure Laterality Date    ADENOIDECTOMY          Family History   Problem Relation Age of Onset    Stroke Father     Stroke Maternal Grandmother     Stroke Maternal Grandfather         Social History     Socioeconomic History    Marital status: Single     Spouse name: Not on file    Number of children: Not on file    Years of education: Not on file    Highest education level: Not on file   Occupational History    Occupation: high school   Tobacco Use    Smoking status: Never Smoker    Smokeless tobacco: Never Used   Vaping Use    Vaping Use: Never used   Substance and Sexual Activity    Alcohol use: Never    Drug use: Never    Sexual activity: Not on file   Other Topics Concern    Not on file   Social History Narrative    Not on file     Social Determinants of Health     Financial Resource Strain: Low Risk     Difficulty of Paying Living Expenses: Not hard at all   Food Insecurity: No Food Insecurity    Worried About Running Out of Food in the Last Year: Never true    [address] in the Last Year: Never true   Transportation Needs:     Lack of Transportation (Medical): Not on file    Lack of Transportation (Non-Medical):  Not on file   Physical Activity:     Days of Exercise per Week: Not on file    Minutes of Exercise per Session: Not on file   Stress:     Feeling of Stress : Not on file   Social Connections:     Frequency of Communication with Friends and Family: Not on file    Frequency of Social Gatherings with Friends and Family: Not on file    Attends [employer] Services: Not on file    Active Member of Clubs or Organizations: Not on file    Attends Club or Organization Meetings: Not on file    Marital Status: Not on file   Intimate Partner Violence:     Fear of Current or Ex-Partner: Not on file    Emotionally Abused: Not on file    Physically Abused: Not on file    Sexually Abused: Not on file   Housing Stability:     Unable to Pay for Housing in the Last Year: Not on file    Number of [place] in the Last Year: Not on file    Unstable Housing in the Last Year: Not on file        /78   Pulse 90   Temp 97.7 °F (36.5 °C) (Temporal)   Ht 5' 11\" (1.803 m)   Wt 222 lb (100.7 kg)   SpO2 96%   BMI 30.96 kg/m²        Physical Exam:    General appearance - alert, well appearing, and in no distress  Mental Status - alert, oriented to person, place, and time  Eyes - pupils equal and reactive, extraocular eye movements intact   Ears - bilateral TM's and external ear canals normal   Nose - normal and patent, no erythema, discharge or polyps   Sinuses - Normal paranasal sinuses without tenderness   Throat - mucous membranes moist, pharynx normal without lesions   Neck - supple, no significant adenopathy   Thyroid - thyroid is normal in size without nodules or tenderness    Chest -  Decreased air flow in the right lung fields. No wheezing. No rhonchi.    Heart - normal rate, regular rhythm, normal S1, S2, no murmurs, rubs, clicks or gallops  Abdomen - soft, nontender, nondistended, no masses or organomegaly   Back exam - full range of motion, no tenderness, palpable spasm or pain on motion   Neurological - alert, oriented, normal speech, no focal findings or movement disorder noted   Musculoskeletal - no joint tenderness, deformity or swelling   Extremities - peripheral pulses normal, no pedal edema, no clubbing or cyanosis   Skin - rash resolved. Labs   No results found for: TSHREFLEX  TSH   Date Value Ref Range Status   07/29/2014 1.410 0.270 - 4.200 uIU/mL Final     Lab Results   Component Value Date     07/29/2014    K 4.4 07/29/2014     07/29/2014    CO2 27 07/29/2014    BUN 7 07/29/2014    CREATININE 0.53 (L) 07/29/2014    GLUCOSE 84 07/29/2014    CALCIUM 9.7 07/29/2014    PROT 6.6 07/29/2014    LABALBU 4.6 07/29/2014    BILITOT 0.8 07/29/2014    ALKPHOS 290 07/29/2014    AST 18 07/29/2014    ALT 10 07/29/2014    LABGLOM >60.0 07/29/2014    GFRAA >60.0 07/29/2014       Lab Results   Component Value Date    WBC 4.2 (L) 07/29/2014    HGB 14.0 07/29/2014    HCT 42.2 07/29/2014    MCV 91.1 07/29/2014     07/29/2014     Stool studies are negative. A/P: William Randolph 22 y.o. male presenting for    1. migraine  Stable   - naproxen (NAPROSYN) 500 MG tablet; Take 1 tablet by mouth 2 times daily as needed for Pain Take with food  Dispense: 60 tablet; Refill: 0    2. Anxiety  Denies any SI or HI   - FLUoxetine (PROZAC) 10 MG capsule; Take 1 capsule by mouth daily  Dispense: 30 capsule; Refill: 3  - Comprehensive Metabolic Panel; Future  - CBC With Auto Differential; Future    3. Adjustment disorder with anxious mood    - FLUoxetine (PROZAC) 10 MG capsule; Take 1 capsule by mouth daily  Dispense: 30 capsule; Refill: 3    4. Need for influenza vaccination    - INFLUENZA, MDCK QUADV, 2 YRS AND OLDER, IM, PF, PREFILL SYR OR SDV, 0.5ML (FLUCELVAX QUADV, PF)    5. Irritable bowel syndrome, unspecified type    - dicyclomine (BENTYL) 10 MG capsule; Take 1 capsule by mouth 4 times daily  Dispense: 120 capsule; Refill: 3  - Comprehensive Metabolic Panel; Future  - CBC With Auto Differential; Future    6. Shortness of breath  Increasing. Decreased lung fields on the right side.  Will get a CXR for further evaluation   - albuterol sulfate HFA (PROVENTIL HFA) 108 (90 Base) MCG/ACT inhaler; Inhale 2 puffs into the lungs every 6 hours as needed for Wheezing or Shortness of Breath Please cover what is under insurance. Dispense: 18 g; Refill: 3  - Full PFT Study With Bronchodilator; Future  - XR CHEST STANDARD (2 VW); Future    7. Gastroesophageal reflux disease without esophagitis    - famotidine (PEPCID) 40 MG tablet; Take 0.5 tablets by mouth 2 times daily as needed (GERD)  Dispense: 30 tablet; Refill: 3  - Comprehensive Metabolic Panel;  Future  - CBC With Auto Differential; Future

## 2022-01-13 NOTE — PROGRESS NOTES
Vaccine Information Sheet, \"Influenza - Inactivated\"  given to Pankaj Agudelo, or parent/legal guardian of  Pankaj Agudelo and verbalized understanding. Patient responses:    Have you ever had a reaction to a flu vaccine? No  Are you able to eat eggs without adverse effects? Yes  Do you have any current illness? No  Have you ever had [place] Syndrome? No    Flu vaccine given per order. Please see immunization tab.

## 2022-01-19 ENCOUNTER — TELEPHONE (OUTPATIENT)
Dept: FAMILY MEDICINE CLINIC | Age: 22
End: 2022-01-19

## 2022-01-25 ENCOUNTER — HOSPITAL ENCOUNTER (OUTPATIENT)
Dept: PULMONOLOGY | Age: 22
Discharge: HOME | End: 2022-01-25
Payer: COMMERCIAL

## 2022-01-25 DIAGNOSIS — R06.02 SHORTNESS OF BREATH: ICD-10-CM

## 2022-01-25 PROCEDURE — 94060 EVALUATION OF WHEEZING: CPT

## 2022-01-25 PROCEDURE — 94726 PLETHYSMOGRAPHY LUNG VOLUMES: CPT

## 2022-01-25 PROCEDURE — 94729 DIFFUSING CAPACITY: CPT

## 2022-01-25 PROCEDURE — 6360000002 HC RX W HCPCS: Performed by: FAMILY MEDICINE

## 2022-01-25 RX ORDER — ALBUTEROL SULFATE 2.5 MG/3ML
2.5 SOLUTION RESPIRATORY (INHALATION) ONCE
Status: COMPLETED | OUTPATIENT
Start: 2022-01-25 | End: 2022-01-25

## 2022-01-25 RX ADMIN — ALBUTEROL SULFATE 2.5 MG: 2.5 SOLUTION RESPIRATORY (INHALATION) at 12:26

## 2022-01-26 PROCEDURE — 94729 DIFFUSING CAPACITY: CPT | Performed by: INTERNAL MEDICINE

## 2022-01-26 PROCEDURE — 94060 EVALUATION OF WHEEZING: CPT | Performed by: INTERNAL MEDICINE

## 2022-01-26 PROCEDURE — 94726 PLETHYSMOGRAPHY LUNG VOLUMES: CPT | Performed by: INTERNAL MEDICINE

## 2022-01-26 NOTE — PROCEDURES
[address]                      [facility], [address] [facility]                    PULMONARY FUNCTION  Valdeirdre Hall. Schildwachter   22 y.o.   male  Height 71 in  Weight 221 lb      Referring provider   Ector Nolan MD    Reading provider   Denice Ann MD    Test meets ATS criteria for acceptability and reproducibility Yes    Diagnosis: PARK: Yes  Cough   No, wheezing Yes  Smoking   no    Spirometry   FVC            6.62 L   135%  Post bronchodilator 6.67 L  136% Change 0 %  FEV1          4.33 L  103%   Post bronchodilator  5.05 L  121%   Change 16%  FEV1/FVC  65  %             Post bronchodilator  75 %  DGE37-29% 2.80 L  59%  Post bronchodilator  4.41 L  93%   Change 57%    Lung volume   SVC           6.59 L  134%   RV              0.99 L 67%   TLC            7.58  L 107%   RV/TLC      13 %    DLCO           85 %     Test interpretation     Spirometry meets ATS criteria for mild obstructive airway disease with significant response to bronchodilator, obstruction is reversible on postbronchodilator attempt. Lung volume essentially normal except for isolated decrease in RV of unclear significance.   Lung volumes are normal       Clinical correlation is recommended     Denice Ann MD [facility], 1/26/2022 1:56 PM

## 2022-01-31 ENCOUNTER — TELEPHONE (OUTPATIENT)
Dept: FAMILY MEDICINE CLINIC | Age: 22
End: 2022-01-31

## 2022-01-31 NOTE — TELEPHONE ENCOUNTER
----- Message from Caitlin Dior sent at 1/31/2022  9:16 AM EST -----  Subject: Message to Provider    QUESTIONS  Information for Provider? Pt needs a form faxed to [employer] services at [address] [employer]   program stating that he is asthmatic. Last name of  is Rachele Finn,   first name is unknown. Fax number is ? [phone]   ---------------------------------------------------------------------------  --------------  Kassie MEDINA  What is the best way for the office to contact you? OK to leave message on   voicemail  Preferred Call Back Phone Number? [phone]  ---------------------------------------------------------------------------  --------------  SCRIPT ANSWERS  Relationship to Patient? Parent  Representative Name? Evert Arreguin  Patient is under 25 and the Parent has custody? No  Is the Representative on the appropriate HIPAA document in Epic?  Yes

## 2022-04-14 ENCOUNTER — OFFICE VISIT (OUTPATIENT)
Dept: FAMILY MEDICINE CLINIC | Age: 22
End: 2022-04-14
Payer: COMMERCIAL

## 2022-04-14 VITALS
DIASTOLIC BLOOD PRESSURE: 64 MMHG | TEMPERATURE: 98.5 F | OXYGEN SATURATION: 97 % | WEIGHT: 222 LBS | HEART RATE: 89 BPM | BODY MASS INDEX: 31.08 KG/M2 | HEIGHT: 71 IN | SYSTOLIC BLOOD PRESSURE: 124 MMHG

## 2022-04-14 DIAGNOSIS — F41.9 ANXIETY: ICD-10-CM

## 2022-04-14 DIAGNOSIS — G89.29 CHRONIC THORACIC BACK PAIN, UNSPECIFIED BACK PAIN LATERALITY: ICD-10-CM

## 2022-04-14 DIAGNOSIS — M54.50 MIDLINE LOW BACK PAIN WITHOUT SCIATICA, UNSPECIFIED CHRONICITY: ICD-10-CM

## 2022-04-14 DIAGNOSIS — G43.919 INTRACTABLE MIGRAINE WITHOUT STATUS MIGRAINOSUS, UNSPECIFIED MIGRAINE TYPE: ICD-10-CM

## 2022-04-14 DIAGNOSIS — M54.6 CHRONIC THORACIC BACK PAIN, UNSPECIFIED BACK PAIN LATERALITY: ICD-10-CM

## 2022-04-14 DIAGNOSIS — F43.22 ADJUSTMENT DISORDER WITH ANXIOUS MOOD: ICD-10-CM

## 2022-04-14 DIAGNOSIS — J44.9 OBSTRUCTIVE AIRWAY DISEASE (HCC): Primary | ICD-10-CM

## 2022-04-14 DIAGNOSIS — R06.02 SHORTNESS OF BREATH: ICD-10-CM

## 2022-04-14 DIAGNOSIS — S03.00XD DISLOCATION OF TEMPOROMANDIBULAR JOINT, SUBSEQUENT ENCOUNTER: ICD-10-CM

## 2022-04-14 PROCEDURE — 3023F SPIROM DOC REV: CPT | Performed by: FAMILY MEDICINE

## 2022-04-14 PROCEDURE — 1036F TOBACCO NON-USER: CPT | Performed by: FAMILY MEDICINE

## 2022-04-14 PROCEDURE — G8417 CALC BMI ABV UP PARAM F/U: HCPCS | Performed by: FAMILY MEDICINE

## 2022-04-14 PROCEDURE — 99214 OFFICE O/P EST MOD 30 MIN: CPT | Performed by: FAMILY MEDICINE

## 2022-04-14 PROCEDURE — 96372 THER/PROPH/DIAG INJ SC/IM: CPT | Performed by: FAMILY MEDICINE

## 2022-04-14 PROCEDURE — G8427 DOCREV CUR MEDS BY ELIG CLIN: HCPCS | Performed by: FAMILY MEDICINE

## 2022-04-14 RX ORDER — ALBUTEROL SULFATE 90 UG/1
2 AEROSOL, METERED RESPIRATORY (INHALATION) EVERY 6 HOURS PRN
Qty: 18 G | Refills: 3 | Status: SHIPPED | OUTPATIENT
Start: 2022-04-14 | End: 2022-07-14 | Stop reason: SDUPTHER

## 2022-04-14 RX ORDER — FLUOXETINE 10 MG/1
10 CAPSULE ORAL DAILY
Qty: 30 CAPSULE | Refills: 3 | Status: SHIPPED | OUTPATIENT
Start: 2022-04-14 | End: 2022-07-14 | Stop reason: SDUPTHER

## 2022-04-14 RX ORDER — KETOROLAC TROMETHAMINE 30 MG/ML
60 INJECTION, SOLUTION INTRAMUSCULAR; INTRAVENOUS ONCE
Status: COMPLETED | OUTPATIENT
Start: 2022-04-14 | End: 2022-04-14

## 2022-04-14 RX ORDER — SUMATRIPTAN 25 MG/1
25 TABLET, FILM COATED ORAL DAILY PRN
Qty: 9 TABLET | Refills: 3 | Status: SHIPPED | OUTPATIENT
Start: 2022-04-14 | End: 2022-07-14 | Stop reason: SDUPTHER

## 2022-04-14 RX ADMIN — KETOROLAC TROMETHAMINE 60 MG: 30 INJECTION, SOLUTION INTRAMUSCULAR; INTRAVENOUS at 14:11

## 2022-04-14 NOTE — PROGRESS NOTES
Chief Complaint   Patient presents with    3 Month Follow-Up    Anxiety     Pt states his anxiety is better. Doing well on medication.  Headache     Pt states his headaches is better. HPI: Perry Pointer Schildwachter 22 y.o. male presenting for    Back and leg pain   Last month patient was working and noticed his back snapped and was unable to move   patinet is taking the naproxen with no relief of symptoms   No radiculopathy   Does admits  some leg pain       Headache  Patient presents for evaluation of headache. Symptoms began about 2 weeks ago. Generally, the headaches last about all day months and occur continuously. The headaches all day. The headaches are usually squeezing and are located in both sides of the head, . The patient rates his most severe headaches a 7 on a scale from 1 to 10. Recently, the headaches have been stable. Work attendance or other daily activities are affected by the headaches. Precipitating factors include: none which have been determined. The headaches are usually not preceded by an aura. Associated neurologic symptoms: decreased physical activity. The patient denies decreased physical activity. Home treatment has included nothing with no improvement. Other history includes: nothing pertinent. Family history includes no known family members with significant headaches. F/u   Stable at this time. Uses imitrex as needed.         IBS follow up   Stable. Patient is doing better since starting the bentyl medication. Labs reviewed with patient and were negative. F/u   Stopped taking the bentyl (old)  Was helping   Admits to some abdominal pain     Adjustment disorder/depression/anxiety  Patient reports he has been having depression, anxiety, stress lately after his godfather (mother's boyfriend) had passed away. Patient reports that he has been getting more stressed and has increased outbursts. Patient has been having a strained relationship with his girlfriend.   Patient does admit to sad and depressed feelings but denies any suicidal homicidal ideations. Denies any auditory visual hallucinations. Patient has had a history of low moods depression for couple months now. Patient also has history of ADHD however is not on medication currently. F/u   reports that hte medication is helping. Denies any SI or HI. Stable. Per mother medication works when he is compliant. GERD   admits to acid reflux   Not food dependant but admits to increase burping. F/u   Has improved. Current Outpatient Medications   Medication Sig Dispense Refill    naproxen (NAPROSYN) 500 MG tablet Take 1 tablet by mouth 2 times daily as needed for Pain Take with food 60 tablet 0    FLUoxetine (PROZAC) 10 MG capsule Take 1 capsule by mouth daily 30 capsule 3    dicyclomine (BENTYL) 10 MG capsule Take 1 capsule by mouth 4 times daily 120 capsule 3    famotidine (PEPCID) 40 MG tablet Take 0.5 tablets by mouth 2 times daily as needed (GERD) 30 tablet 3    albuterol sulfate HFA (PROVENTIL HFA) 108 (90 Base) MCG/ACT inhaler Inhale 2 puffs into the lungs every 6 hours as needed for Wheezing or Shortness of Breath Please cover what is under insurance. 18 g 3    SUMAtriptan (IMITREX) 25 MG tablet Take 1 tablet by mouth daily as needed for Migraine 9 tablet 5    EPINEPHrine (EPIPEN) 0.3 MG/0.3ML SOAJ injection Use as directed for allergic reaction 2 each 1     No current facility-administered medications for this visit. ROS  CONSTITUTIONAL: The patient denies fevers, chills, sweats and body ache. HEENT: admits to  headache that have improved,  Denies blurry vision, eye pain, tinnitus, vertigo,  sore throat, neck or thyroid masses. Denies nasal congestion  RESPIRATORY: admits to shortness of breath. CARDIAC: Denies chest pain, pressure, palpitations, Denies lower extremity edema. GASTROINTESTINAL: denies any abdominal pain.  Denies any constipation or diarrhea   GENITOURINARY: Denies dysuria, hematuria, nocturia or frequency, urinary incontinence. NEUROLOGIC: Denies headaches, dizziness, syncope, weakness  MUSCULOSKELETAL: denies changes in range of motion, joint pain, stiffness. ENDOCRINOLOGY: Denies heat or cold intolerance. HEMATOLOGY: Denies easy bleeding or blood transfusion,anemia  DERMATOLOGY: Admits to pimple/scabs on his forehead. PSYCHIATRY: Admits to depression, anxiety, stress that has improved. Past Medical History:   Diagnosis Date    ADHD     Obstructive airway disease ([facility] [address] 01/2022    PFT good response with brochodilator    Oppositional defiant disorder         Past Surgical History:   Procedure Laterality Date    ADENOIDECTOMY          Family History   Problem Relation Age of Onset    Stroke Father     Stroke Maternal Grandmother     Stroke Maternal Grandfather         Social History     Socioeconomic History    Marital status: Single     Spouse name: Not on file    Number of children: Not on file    Years of education: Not on file    Highest education level: Not on file   Occupational History    Occupation: high school   Tobacco Use    Smoking status: Never Smoker    Smokeless tobacco: Never Used   Vaping Use    Vaping Use: Never used   Substance and Sexual Activity    Alcohol use: Never    Drug use: Never    Sexual activity: Not on file   Other Topics Concern    Not on file   Social History Narrative    Not on file     Social Determinants of Health     Financial Resource Strain: Low Risk     Difficulty of Paying Living Expenses: Not hard at all   Food Insecurity: No Food Insecurity    Worried About Running Out of Food in the Last Year: Never true    [address] in the Last Year: Never true   Transportation Needs:     Lack of Transportation (Medical): Not on file    Lack of Transportation (Non-Medical):  Not on file   Physical Activity:     Days of Exercise per Week: Not on file    Minutes of Exercise per Session: Not on file   Stress:     Feeling of Stress : Not on file   Social Connections:     Frequency of Communication with Friends and Family: Not on file    Frequency of Social Gatherings with Friends and Family: Not on file    Attends [employer] Services: Not on file    Active Member of Clubs or Organizations: Not on file    Attends Club or Organization Meetings: Not on file    Marital Status: Not on file   Intimate Partner Violence:     Fear of Current or Ex-Partner: Not on file    Emotionally Abused: Not on file    Physically Abused: Not on file    Sexually Abused: Not on file   Housing Stability:     Unable to Pay for Housing in the Last Year: Not on file    Number of [place] in the Last Year: Not on file    Unstable Housing in the Last Year: Not on file        /64   Pulse 89   Temp 98.5 °F (36.9 °C) (Oral)   Ht 5' 11\" (1.803 m)   Wt 222 lb (100.7 kg)   SpO2 97%   BMI 30.96 kg/m²        Physical Exam:    General appearance - alert, well appearing, and in no distress  Mental Status - alert, oriented to person, place, and time  Eyes - pupils equal and reactive, extraocular eye movements intact   Ears - bilateral TM's and external ear canals normal   Nose - normal and patent, no erythema, discharge or polyps   Sinuses - Normal paranasal sinuses without tenderness   Throat - mucous membranes moist, pharynx normal without lesions   Neck - supple, no significant adenopathy   Thyroid - thyroid is normal in size without nodules or tenderness    Chest -  Decreased air flow in the right lung fields. No wheezing. No rhonchi. Heart - normal rate, regular rhythm, normal S1, S2, no murmurs, rubs, clicks or gallops  Abdomen - soft, nontender, nondistended, no masses or organomegaly   Back exam -paraspinal tenderness palpation. Mainly in the lumbar spine.   Neurological - alert, oriented, normal speech, no focal findings or movement disorder noted   Musculoskeletal - no joint tenderness, deformity or swelling   Extremities - peripheral pulses normal, no pedal edema, no clubbing or cyanosis   Skin -excoriations noted on the forehead. No signs of discharge or surrounding erythema. Labs   No results found for: TSHREFLEX  TSH   Date Value Ref Range Status   07/29/2014 1.410 0.270 - 4.200 uIU/mL Final     Lab Results   Component Value Date     01/13/2022    K 4.1 01/13/2022     01/13/2022    CO2 24 01/13/2022    BUN 7 01/13/2022    CREATININE 0.93 01/13/2022    GLUCOSE 99 01/13/2022    CALCIUM 9.6 01/13/2022    PROT 7.5 01/13/2022    LABALBU 4.5 01/13/2022    BILITOT <0.2 01/13/2022    ALKPHOS 92 01/13/2022    AST 19 01/13/2022    ALT 23 01/13/2022    LABGLOM >60.0 01/13/2022    GFRAA >60.0 01/13/2022    GLOB 3.0 01/13/2022       Lab Results   Component Value Date    WBC 6.1 01/13/2022    HGB 15.0 01/13/2022    HCT 45.0 01/13/2022    MCV 89.0 01/13/2022     01/13/2022     Stool studies are negative. A/P: Armando Baker Schildwachter 22 y.o. male presenting for    1. Anxiety  Denies any SI or Hi   - FLUoxetine (PROZAC) 10 MG capsule; Take 1 capsule by mouth daily  Dispense: 30 capsule; Refill: 3    2. Adjustment disorder with anxious mood    - FLUoxetine (PROZAC) 10 MG capsule; Take 1 capsule by mouth daily  Dispense: 30 capsule; Refill: 3    3. migraine    - SUMAtriptan (IMITREX) 25 MG tablet; Take 1 tablet by mouth daily as needed for Migraine  Dispense: 9 tablet; Refill: 3    4. Shortness of breath  Uncontrolled as patient is finding self using his albuterol more often. We will see if starting a maintenance inhaler will help with the symptoms. We will have patient follow-up in 3 months. - albuterol sulfate HFA (PROVENTIL HFA) 108 (90 Base) MCG/ACT inhaler; Inhale 2 puffs into the lungs every 6 hours as needed for Wheezing or Shortness of Breath Please cover what is under insurance. Dispense: 18 g; Refill: 3  - fluticasone-salmeterol (ADVAIR HFA) 115-21 MCG/ACT inhaler;  Inhale 2 puffs into the lungs 2 times daily  Dispense: 1 each; Refill: 3    5. Obstructive airway disease (HCC)    - albuterol sulfate HFA (PROVENTIL HFA) 108 (90 Base) MCG/ACT inhaler; Inhale 2 puffs into the lungs every 6 hours as needed for Wheezing or Shortness of Breath Please cover what is under insurance. Dispense: 18 g; Refill: 3  - fluticasone-salmeterol (ADVAIR HFA) 115-21 MCG/ACT inhaler; Inhale 2 puffs into the lungs 2 times daily  Dispense: 1 each; Refill: 3    6. Midline low back pain without sciatica, unspecified chronicity    - XR LUMBAR SPINE (MIN 4 VIEWS); Future  - [facility] [place]  - ketorolac (TORADOL) injection 60 mg    7. Chronic thoracic back pain, unspecified back pain laterality    - [facility] [place]  - ketorolac (TORADOL) injection 60 mg    8. Dislocation of temporomandibular joint, subsequent encounter  TMJ. Will need to go to his dentist to see if he is a candidate for a mouthguard.

## 2022-04-14 NOTE — PATIENT INSTRUCTIONS
Patient Education        Temporomandibular Disorder: Care Instructions  Overview     Temporomandibular disorders (TMDs) are problems with the muscles and joints that connect your jaw to your skull. The disorders cause pain when you talk,chew, swallow, or yawn. You may feel this pain on one or both sides. TMDs are often caused by tight jaw muscles. The tightness can be caused by clenching or grinding your teeth. This may happen when you have a lot of stressin your life. If you lower your stress, you may be able to stop clenching or grinding your teeth. This will help relax your jaw and reduce your pain. Your doctor maysuggest a dental splint. Splints can help reduce teeth grinding and clenching. You may also be able to do some things at home to feel better. But if none of this works, your doctor may prescribe medicine to help relax your muscles andcontrol the pain. Follow-up care is a key part of your treatment and safety. Be sure to make and go to all appointments, and call your doctor if you are having problems. It's also a good idea to know your test results and keep alist of the medicines you take. How can you care for yourself at home?  Put either an ice pack or a warm, moist cloth on your jaw for 15 minutes several times a day. You can try switching back and forth between moist heat and cold.  Make eating easy on your jaw. Choose softer foods that are easy to chew like eggs, yogurt, or soup. Avoid hard foods that cause your jaws to work very hard. Try cutting your food into small pieces. And if your jaw gets too painful to chew, or if it locks, you may need to puree your food for a while.  To relax your jaw, repeat this exercise for a few minutes every morning and evening. Watch yourself in a mirror. Gently open and close your mouth. Move your jaw straight up and down. But don't do this if it makes your pain worse.  Manage stress.  You may be clenching or tightening your muscles when you are under stress.  Get at least 30 minutes of exercise on most days of the week to relieve stress. Walking is a good choice.  Ask your doctor if you can take an over-the-counter pain medicine, such as acetaminophen (Tylenol), ibuprofen (Advil, Motrin), or naproxen (Aleve). Be safe with medicines. Read and follow all instructions on the label.  Use good posture for sitting and standing. Slumping your shoulders disturbs the alignment of your facial bones and muscles.  Don't:  ? Hold a phone between your shoulder and your jaw. ? Open your mouth all the way, like when you sing loudly or yawn. ? Clench or grind your teeth, bite your lips, or chew your fingernails. ? Clench things such as pens, pipes, or cigars between your teeth. When should you call for help? Call your doctor now or seek immediate medical care if:     Your jaw is locked open or shut or it is hard to move your jaw. Watch closely for changes in your health, and be sure to contact your doctor if:     Your jaw pain gets worse.      Your face is swollen.      You do not get better as expected. Where can you learn more? Go to [email or web address] and sign in to your [employer] account. Enter W110 in the [employer] box to learn more about \"Temporomandibular Disorder: Care Instructions. \"     If you do not have an account, please click on the \"Sign Up Now\" link. Current as of: June 30, 2021               Content Version: 13.2  © 2006-2022 [employer]. Care instructions adapted under license by [facility] ([facility]). If you have questions about a medical condition or this instruction, always ask your healthcare professional. [place] [address] any warranty or liability for your use of this information.

## 2022-05-26 ENCOUNTER — HOSPITAL ENCOUNTER (OUTPATIENT)
Dept: PHYSICAL THERAPY | Age: 22
Setting detail: THERAPIES SERIES
Discharge: HOME | End: 2022-05-26
Payer: COMMERCIAL

## 2022-05-26 PROCEDURE — 97161 PT EVAL LOW COMPLEX 20 MIN: CPT

## 2022-05-26 ASSESSMENT — PAIN DESCRIPTION - LOCATION: LOCATION: BACK

## 2022-05-26 ASSESSMENT — PAIN DESCRIPTION - ONSET: ONSET: ON-GOING

## 2022-05-26 ASSESSMENT — PAIN DESCRIPTION - DESCRIPTORS: DESCRIPTORS: SQUEEZING

## 2022-05-26 ASSESSMENT — PAIN SCALES - GENERAL: PAINLEVEL_OUTOF10: 4

## 2022-05-26 ASSESSMENT — PAIN - FUNCTIONAL ASSESSMENT: PAIN_FUNCTIONAL_ASSESSMENT: PREVENTS OR INTERFERES WITH ALL ACTIVE AND SOME PASSIVE ACTIVITIES

## 2022-05-26 ASSESSMENT — PAIN DESCRIPTION - PAIN TYPE: TYPE: CHRONIC PAIN

## 2022-05-26 ASSESSMENT — PAIN DESCRIPTION - FREQUENCY: FREQUENCY: CONTINUOUS

## 2022-05-26 ASSESSMENT — PAIN DESCRIPTION - ORIENTATION: ORIENTATION: LOWER;MID

## 2022-05-26 NOTE — PROGRESS NOTES
[facility]  PHYSICAL THERAPY EVALUATION      Physical Therapy: Initial Evaluation    Patient: Ana Laura Ya (79 y.o.     male)   Examination Date:   Plan of Care Certification Period: 2022 to    Progress Note Counter: Eval only   :  2000 ;    Confirmed: Yes MRN: [id number]  CSN: [id number]   Insurance: Payor: [facility] Born / Plan: Danitza Cruz / Product Type: *No Product type* /   Insurance ID: [id number] - (Medicaid Managed) Secondary Insurance (if applicable):    Referring Physician: Kath Abreu MD     PCP: La Navarrete MD Visits to Date/Visits Approved:     No Show/Cancelled Appts: 0      Medical Diagnosis: Low back pain, unspecified [M54.50]  Pain in thoracic spine [M54.6]    Treatment Diagnosis: thoracic pain, lumbar pain, poor postural awareness, decreased thoracolumbar AROM, decreased periscapular, lower abdominal, and trunk strength, and decreased HS flexibility     PERTINENT MEDICAL HISTORY   Patient Assessed for Rehabilitation Services: Yes       Medical History: Chart Reviewed: Yes   Past Medical History:   Diagnosis Date    ADHD     Obstructive airway disease ([facility] [address] 2022    PFT good response with brochodilator    Oppositional defiant disorder      Surgical History:   Past Surgical History:   Procedure Laterality Date    ADENOIDECTOMY         Medications:   Current Outpatient Medications:     FLUoxetine (PROZAC) 10 MG capsule, Take 1 capsule by mouth daily, Disp: 30 capsule, Rfl: 3    SUMAtriptan (IMITREX) 25 MG tablet, Take 1 tablet by mouth daily as needed for Migraine, Disp: 9 tablet, Rfl: 3    albuterol sulfate HFA (PROVENTIL HFA) 108 (90 Base) MCG/ACT inhaler, Inhale 2 puffs into the lungs every 6 hours as needed for Wheezing or Shortness of Breath Please cover what is under insurance., Disp: 18 g, Rfl: 3    fluticasone-salmeterol (ADVAIR HFA) 115-21 MCG/ACT inhaler, Inhale 2 puffs into the lungs 2 times daily, Disp: 1 each, Rfl: 3    naproxen (NAPROSYN) 500 MG tablet, Take 1 tablet by mouth 2 times daily as needed for Pain Take with food, Disp: 60 tablet, Rfl: 0    dicyclomine (BENTYL) 10 MG capsule, Take 1 capsule by mouth 4 times daily, Disp: 120 capsule, Rfl: 3    famotidine (PEPCID) 40 MG tablet, Take 0.5 tablets by mouth 2 times daily as needed (GERD), Disp: 30 tablet, Rfl: 3    EPINEPHrine (EPIPEN) 0.3 MG/0.3ML SOAJ injection, Use as directed for allergic reaction, Disp: 2 each, Rfl: 1  Allergies: Peanut-containing drug products      SUBJECTIVE EXAMINATION     History obtained from[de-identified] Patient,      Family/Caregiver Present: Yes (mother)    Subjective History:    Subjective: Pt reports having onset of low back and mid back pain ~4 months ago after lifitng heavy object at work that has remained unchanged since time of injury. Pt denies having any radicular symptoms, back pain only. Pt was provided Toradol injection in back from MD which pt reports helped for a week then pain returned. Additional Pertinent Hx (if applicable):     Prior diagnostic testing[de-identified] X-ray (No acute fractures. Disk spaces are narrowed at L5-S1. No significant spondylolisthesis or spondylolysis.    The SI joints are unremarkable.)  Previous treatments prior to current episode?: Injections  Comment: RTD 7/14/22      Learning/Language: Learning  Does the patient/guardian have any barriers to learning?: No barriers  Will there be a co-learner?: No  What is the preferred language of the patient/guardian?: English  Is an  required?: No     Pain Screening    Pain Screening  Patient Currently in Pain: Yes  Pain Assessment: 0-10  Pain Level: 4  Best Pain Level: 2  Worst Pain Level: 7  Pain Type: Chronic pain  Pain Location: Back  Pain Orientation: Lower,Mid  Pain Descriptors: Squeezing  Pain Frequency: Continuous  Pain Onset: On-going  Functional Pain Assessment: Prevents or interferes with all active and some passive activities  Aggravating factors: Standing,Lifting,Sitting,Walking  Pain Management/Relieving Factors: Heat,Rest,Ice    Functional Status    Social History:    Social History  Lives With: Family    Occupation/Interests:   Occupation: Volunteer work  Type of Occupation: [employer]  Job Duties: Prolonged standing,Heavy lifting,Moderate lifting,Repetitive lifting  Leisure & Hobbies: video games, TV    Prior Level of Function:   100% Independent     Current Level of Function:   50%      ADL Assistance: Independent  Homemaking Assistance: Independent  Homemaking Responsibilities: Yes  Ambulation Assistance: Independent  Transfer Assistance: Independent  Active : No    OBJECTIVE EXAMINATION     Review of Systems:  Follows Commands: Within Functional Limits    Observations:   General Observations  Description: FHP, roudned shoudlers, increased thoracic kyphosis; poor postural awareness in sitting    Palpation:   Lumbar Spine Palpation: mod tightness thoracolumbar parapsinals L>R    Left AROM  Right AROM         AROM LLE (degrees)  L Hip Flexion 0-125: 90    AROM RLE (degrees)  R Hip Flexion 0-125: 96     Left Strength  Right Strength         Strength LUE  L Shoulder Extension: 4+/5 (lats)  L Middle Trapezius: 4/5  L Rhomboids: 4+/5  L Lower Trapezius: 4-/5  Strength LLE  L Hip Flexion: 5/5  L Hip Extension: 5/5  L Hip ABduction: 5/5  L Hip ADduction: NT  L Hip Internal Rotation: 5/5  L Hip External Rotation: 5/5  L Knee Flexion: 5/5  L Knee Extension: 5/5    Strength RUE  R Shoulder Extension: 4+/5 (lats)  R Middle Trapezius: 4+/5  R Rhomboids: 4+/5  R Lower Trapezius: 4/5  Strength RLE  R Hip Flexion: 5/5  R Hip Extension: 5/5  R Hip ABduction: 5/5  R Hip ADduction: NT  R Hip Internal Rotation: 5/5  R Hip External Rotation: 5/5  R Knee Flexion: 5/5  R Knee Extension: 5/5     Thoracic Assessment     AROM Thoracic Spine   Thoracic spine general AROM: Seated rotation 75%, B SB 75%, flexion 50%, Ext 75% Lumbar Assessment     AROM Lumbar Spine   Lumbar spine general AROM: flex 75% (with knee flexion compnesations) , ext 50%, SB B 25% (with forward flexion compenstaions), Rot WNL's standing        Trunk Strength     Trunk Strength  Trunk Extension: 4/5 (mild weakness)  Upper abdominals: Normal  Lower abdominals: Poor     Muscle Length/Flexibility:   Muscle Length LE  90/90 SLR (Hamstring Tightness): R 35 L 46  Right Psoas / Iliacus: WNL  Left Rectus Femoris: WNL    Joint Mobility:   Joint Integrity Cervical/Thoracic  General Joint Integrity - Cervical: Thoracic WNL  Accessory Motion Assessment  T-12: WNL  L1: WNL  L2: WNL  L3: WNL  L4: WNL  L5: WNL    Outcomes Score:  Exam: EDY 6/50    Treatment:    Exercises:   Exercises  Exercise 1: thoracic extension and flexion mobility*  Exercise 2: Quadruped thoracic rotation*  Exercise 3: UBE*  Exercise 4: wall slides with lift off for thoracic extension*  Exercise 5: corner stretch*  Exercise 6: 90/90 alt LE taps*  Exercise 7: HS stretches*  Exercise 8: scap retract*  Exercise 9: prone scap*  Exercise 10: rows/lats*     Modalities:  Modalities  Modalities:  (MHP/CP/IFC PRN*)     Manual:  Manual Therapy  Soft Tissue Mobilizaton: B thoracolumbar paraspinals*     *Indicates exercise,modality, or manual techniques to be initiated when appropriate    ASSESSMENT     Impression: Assessment: Pt presents with onset of mid and low back pain ~4 months ago after lifting a heavy object at work. He currently presents with poor postural awareness, decreased thoracolumbar AROM, decreased periscapular, lower abdominal, and trunk strength, and decreased HS flexibility. These impairments currently limit his functional abilities to stand prolonged periods, sit prolonged periods, perform repetitive lifting, and work duties without increased pain or limitations at [facility].     Body Structures, Functions, Activity Limitations Requiring Skilled Therapeutic Intervention: Increased pain,Decreased posture,Decreased ADL status,Decreased ROM,Decreased body mechanics,Decreased strength,Decreased tolerance to work activity    Statement of Medical Necessity: Physical Therapy is both indicated and medically necessary as outlined in the POC to increase the likelihood of meeting the functionally related goals stated below.      Patient's Activity Tolerance: Patient tolerated evaluation without incident      Patient's rehabilitation potential/prognosis is considered to be: Excellent    Factors which may impact rehabilitation potential include: None     Patient Education: PT Education: [place] of Care,Evaluative findings,Insurance,Posture     GOALS   Patient Goal(s): Patient goals : \"to fix my back and make it stronger\"    Short Term Goals Completed by 2-3 weeks Goal Status   STG 1The pt will demonstrate improved postural awareness requiring <25% VC's with exercises New   STG 2 The pt will have decreased thoracic and lumbar pain </=1/10 in order to increase ease with work duties New       Long Term Goals Completed by 4-6 weeks Goal Status   LTG 1 The pt will have a decrease in EDY score 0/50 points in order to increase functional activity tolerance New   LTG 2 The pt will demo improved thoracolumbar AROM WNL's all aplanes and B 90/90 HS flexibility </=25* in order to increase ease with work related standing and lifting activity New   LTG 3 The pt will demonstrate improved B periscapular strength >/=4+/5, lower abdominal strength >/=fair, and trunk extension strength WNL's in order to lift/carry without pain New   LTG 4 The pt will be indep/compliant with HEP in order to self-manage symptoms upon D/C New     TREATMENT PLAN       Specific Instructions for Next Treatment: Provide HEP    Treatment may include any combination of the following: Strengthening,ROM,Neuromuscular re-education,Manual Therapy - Soft Tissue Mobilization,Pain management,Modalities,Positioning,Patient/Caregiver education & training,Home exercise program,Return to work related activity     Frequency / Duration:  Patient to be seen 2 times per week for 4-6 weeks      Eval Complexity:   Decision Making: Low Complexity  History: Personal Factors and/or Comorbidities Impacting POC: Medium  Examination of body system(s) including body structures and functions, activity limitations, and/or participation restrictions: High  Exam: [address] 6/50  Clinical Presentation: Low    POST-PAIN     Pain Rating (0-10 pain scale):   4/10  Location and pain description same as pre-treatment unless indicated. Action: [] NA  [] Call Physician  [] Perform HEP  [] Meds as prescribed [x] Other -Postural awareness    Evaluation and patient rights have been reviewed and patient agrees with plan of care. Yes  [x]  No  []   Explain:     [place] Fall Risk Assessment  Risk Factor Scale  Score   History of Falls [] Yes  [x] No 25  0    Secondary Diagnosis [] Yes  [x] No 15  0    Ambulatory Aid [] Furniture  [] Crutches/cane/walker  [x] None/bedrest/wheelchair/nurse 30  15  0    IV/Heparin Lock [] Yes  [x] No 20  0    Gait/Transferring [] Impaired  [] Weak  [x] Normal/bedrest/immobile 20  10  0    Mental Status [] Forgets limitations  [x] Oriented to own ability 15  0       Total: 0     Based on the Assessment score: check the appropriate box.   [x]  No intervention needed   Low =   Score of 0-24  []  Use standard prevention interventions Moderate =  Score of 24-44   [] Discuss fall prevention strategies   [] Indicate moderate falls risk on eval  []  Use high risk prevention interventions High = Score of 45 and higher   [] Discuss fall prevention strategies   [] Provide supervision during treatment time    Minutes:  PT Individual Minutes  Time In: 1013  Time Out: 1035  Minutes: 22  Timed Code Treatment Minutes: 0 Minutes  Procedure Minutes: 22 eval      Electronically signed by Venkata Suresh PT on 5/26/22 at 11:06 AM EDT

## 2022-05-26 NOTE — PROGRESS NOTES
[address]  PHYSICAL THERAPY PLAN OF CARE   [address]   [place], [address] [facility]         Ph: [phone]  Fax: [phone]    [] Certification  [] Recertification [x]  Plan of Care  [] Progress Note [] Discharge      Referring Provider: Ann Foreman MD   From:  Courtney Celaya, PT , DPT  Patient: Luann Dowell (25 y.o. male) : 2000 Date: 2022   Medical Diagnosis: Low back pain, unspecified [M54.50]  Pain in thoracic spine [M54.6]    Treatment Diagnosis: thoracic pain, lumbar pain, poor postural awareness, decreased thoracolumbar AROM, decreased periscapular, lower abdominal, and trunk strength, and decreased HS flexibility    Progress Report Period from:  2022  to 2022    Visits to Date: 1 No Show: 0 Cancelled Appts: 0    OBJECTIVE:   Short Term Goals - Time Frame for Short term goals: 2-3 weeks    Goals Current/Discharge status  Status   Short term goal 1: The pt will demonstrate improved postural awareness requiring <25% VC's with exercises  Poor awareness New   Short term goal 2: The pt will have decreased thoracic and lumbar pain </=1/10 in order to increase ease with work duties  Pain ranges from 2-7/10  New     Long Term Goals - Time Frame for Long term goals : 4-6 weeks  Goals Current/ Discharge status Status   Long term goal 1: The pt will have a decrease in EDY score 0/50 points in order to increase functional activity tolerance 6/50 New   Long term goal 2: The pt will demo improved thoracolumbar AROM WNL's all aplanes and B 90/90 HS flexibility </=25* in order to increase ease with work related standing and lifting activity AROM LLE (degrees)  L Hip Flexion 0-125: 90   AROM RLE (degrees)  R Hip Flexion 0-125: 96      AROM Thoracic Spine   Thoracic spine general AROM: Seated rotation 75%, B SB 75%, flexion 50%, Ext 75%  AROM Lumbar Spine   Lumbar spine general AROM: flex 75% (with knee flexion compnesations) , ext 50%, SB B 25% (with forward flexion compenstaions), Rot WNL's standing New   Long term goal 3: The pt will demonstrate improved B periscapular strength >/=4+/5, lower abdominal strength >/=fair, and trunk extension strength WNL's in order to lift/carry without pain Strength LUE  L Shoulder Extension: 4+/5 (lats)  L Middle Trapezius: 4/5  L Rhomboids: 4+/5  L Lower Trapezius: 4-/5  Strength RUE  R Shoulder Extension: 4+/5 (lats)  R Middle Trapezius: 4+/5  R Rhomboids: 4+/5  R Lower Trapezius: 4/5  Trunk Strength  Trunk Extension: 4/5 (mild weakness)  Upper abdominals: Normal  Lower abdominals: Poor New   Long term goal 4: The pt will be indep/compliant with HEP in order to self-manage symptoms upon D/C ongoing New     Body Structures, Functions, Activity Limitations Requiring Skilled Therapeutic Intervention: Increased pain,Decreased posture,Decreased ADL status,Decreased ROM,Decreased body mechanics,Decreased strength,Decreased tolerance to work activity  Assessment: Pt presents with onset of mid and low back pain ~4 months ago after lifting a heavy object at work. He currently presents with poor postural awareness, decreased thoracolumbar AROM, decreased periscapular, lower abdominal, and trunk strength, and decreased HS flexibility. These impairments currently limit his functional abilities to stand prolonged periods, sit prolonged periods, perform repetitive lifting, and work duties without increased pain or limitations at [employer]. Specific Instructions for Next Treatment: Provide HEP  Therapy Prognosis: Excellent    PT Education: Goals;PT Role;Plan of Care;Evaluative findings; Insurance;Posture    PLAN: [x] Evaluate and Treat  Frequency/Duration:  Plan Frequency: 2  Plan weeks: 4-6  Specific Instructions for Next Treatment: Provide HEP  Current Treatment Recommendations: Strengthening,ROM,Neuromuscular re-education,Manual Therapy - Soft Tissue Mobilization,Pain management,Modalities,Positioning,Patient/Caregiver education & training,Home exercise program,Return to work related activity                      Patient Status:[x] Continue/ Initiate plan of Care    [] Discharge PT. Recommend pt continue with HEP. [] Additional visits requested, Please re-certify for additional visits:    [] Hold         Signature: Electronically signed by Kt Mace PT on 5/26/22 at 11:04 AM EDT    If you have any questions or concerns, please don't hesitate to call. Thank you for your referral.    I have reviewed this plan of care and certify a need for medically necessary rehabilitation services.     Physician Signature:__________________________________________________________  Date:  Please sign and return

## 2022-06-02 ENCOUNTER — HOSPITAL ENCOUNTER (OUTPATIENT)
Dept: PHYSICAL THERAPY | Age: 22
Setting detail: THERAPIES SERIES
Discharge: HOME | End: 2022-06-02
Payer: COMMERCIAL

## 2022-06-02 PROCEDURE — 97110 THERAPEUTIC EXERCISES: CPT

## 2022-06-02 ASSESSMENT — PAIN DESCRIPTION - ORIENTATION: ORIENTATION: LOWER;MID

## 2022-06-02 ASSESSMENT — PAIN DESCRIPTION - PAIN TYPE: TYPE: CHRONIC PAIN

## 2022-06-02 ASSESSMENT — PAIN DESCRIPTION - LOCATION: LOCATION: BACK

## 2022-06-02 ASSESSMENT — PAIN DESCRIPTION - DESCRIPTORS: DESCRIPTORS: TIGHTNESS;SORE

## 2022-06-02 ASSESSMENT — PAIN SCALES - GENERAL: PAINLEVEL_OUTOF10: 5

## 2022-06-02 NOTE — PROGRESS NOTES
[facility] [facility]  Outpatient Physical Therapy    Treatment Note        Date: 2022  Patient: Mandie Curran  : 2000   Confirmed: Yes  MRN: [id number]  Referring Provider: Murray Alves MD   Secondary Referring Provider (If applicable):     Medical Diagnosis: Low back pain, unspecified [M54.50]  Pain in thoracic spine [M54.6]    Treatment Diagnosis: thoracic pain, lumbar pain, poor postural awareness, decreased thoracolumbar AROM, decreased periscapular, lower abdominal, and trunk strength, and decreased HS flexibility    Visit Information:  Insurance: Payor: Eduardo Seller / Plan: Liza Bean / Product Type: *No Product type* /   PT Visit Information  Total # of Visits Approved:  (48 Units)  Total # of Visits to Date: 2  No Show: 0  Canceled Appointment: 0  Progress Note Counter: -12  (3/48 units )    Subjective Information:  Subjective: Pt arrived to therapy today reporting back pain of 5/10 with low back > thoracic at this time. Pt's mother present throughout the entire  tx session. HEP Compliance:  [] Good [] Fair [] Poor [x] HEP provided this tx session. Pain Screening  Patient Currently in Pain: Yes  Pain Assessment: 0-10  Pain Level: 5  Pain Type: Chronic pain  Pain Location: Back  Pain Orientation: Lower,Mid  Pain Descriptors: Tightness,Sore    Treatment:  Exercises:  Exercises  Exercise 2: Quadruped thoracic rotation 10\" x 3 Luis. Exercise 3: UBE L 3   2' / 2'  Exercise 4: wall slides with lift off for thoracic extension 5\" x 10  Exercise 5: corner stretch / doorway 3 position 15\" x 3  Exercise 6: 90/90 alt LE taps x 20  / Reverse Crunch x 20 / TA Iso with Leg Ext from HL position. Exercise 7: HS stretches Supine 90/90 15\" x 3 Luis. Exercise 10: rows/lats/High Row  RTB x 15 ea. Exercise 11: SUREKHA 3' / Prone Ext x 10  Exercise 12: Child Pose 3- position 10\" x 3 each.   Exercise 13: Seated Thoracic Ext from chair 15\" x 3  Exercise 20: HEP: SUREKHA, PPU, Child Pose 3-way, Supine HS stretch 90/90, Seated Thoracic Ext, Supine 90/90 toe touch, Supine TA with Leg Ext. Objective Measures:        Strength: [x] NT  [] MMT completed:        ROM: [x] NT  [] ROM measurements:        Balance: [x] NT       Observations: [x] NT       Assessment: Body Structures, Functions, Activity Limitations Requiring Skilled Therapeutic Intervention: Increased pain,Decreased posture,Decreased ADL status,Decreased ROM,Decreased body mechanics,Decreased strength,Decreased tolerance to work activity  Assessment: Multiple exercises added to program with focus on mobilty of lumbar and thoracic spine with gentle core strengthening exercises added for trunk stability. Provided pt with HEP, reviewed with pt, no questions or concerns reported by pt with current program.  Treatment Diagnosis: thoracic pain, lumbar pain, poor postural awareness, decreased thoracolumbar AROM, decreased periscapular, lower abdominal, and trunk strength, and decreased HS flexibility  Therapy Prognosis: Excellent       Patient Education: [x] NA       Post-Pain Assessment:       Pain Rating (0-10 pain scale):   2/10   Location and pain description same as pre-treatment unless indicated.    Action: [] NA   [x] Perform HEP  [] Meds as prescribed  [] Modalities as prescribed   [] Call Physician     GOALS   Patient Goal(s): Patient goals : \"to fix my back and make it stronger\"    Short Term Goals Completed by 2-3 weeks Goal Status   STG 1 The pt will demonstrate improved postural awareness requiring <25% VC's with exercises In progress   STG 2 The pt will have decreased thoracic and lumbar pain </=1/10 in order to increase ease with work duties In progress       Long Term Goals Completed by 4-6 weeks Goal Status   LTG 1 The pt will have a decrease in EDY score 0/50 points in order to increase functional activity tolerance In progress   LTG 2 The pt will demo improved thoracolumbar AROM WNL's all aplanes and B 90/90 HS flexibility </=25* in order to increase ease with work related standing and lifting activity In progress   LTG 3 The pt will demonstrate improved B periscapular strength >/=4+/5, lower abdominal strength >/=fair, and trunk extension strength WNL's in order to lift/carry without pain In progress   LTG 4 The pt will be indep/compliant with HEP in order to self-manage symptoms upon D/C In progress            Plan:  Frequency/Duration:  Plan  Plan Frequency: 2  Plan weeks: 4-6  Current Treatment Recommendations: Strengthening,ROM,Neuromuscular re-education,Manual Therapy - Soft Tissue Mobilization,Pain management,Modalities,Positioning,Patient/Caregiver education & training,Home exercise program,Return to work related activity  Pt to continue current HEP. See objective section for any therapeutic exercise changes, additions or modifications this date.     Therapy Time:      PT Individual Minutes  Time In: 0210  Time Out: 6807  Minutes: 42  Timed Code Treatment Minutes: 42 Minutes  Procedure Minutes:0  Timed Activity Minutes Units   Ther Ex 42 3     Electronically signed by Juliana Jara PTA on 6/2/22 at 10:08 AM EDT

## 2022-06-14 ENCOUNTER — APPOINTMENT (OUTPATIENT)
Dept: PHYSICAL THERAPY | Age: 22
End: 2022-06-14
Payer: COMMERCIAL

## 2022-06-30 ENCOUNTER — HOSPITAL ENCOUNTER (OUTPATIENT)
Dept: PHYSICAL THERAPY | Age: 22
Setting detail: THERAPIES SERIES
Discharge: HOME | End: 2022-06-30
Payer: COMMERCIAL

## 2022-06-30 NOTE — PROGRESS NOTES
Therapy                            Cancellation/No-show Note      Date: 2022  Patient: Ascencion Boothe (44 y.o. male)  : 2000  MRN:  [id number]  Referring Physician: Zuleyma Bill MD     Medical Diagnosis: Low back pain, unspecified [M54.50]  Pain in thoracic spine [M54.6]      Visit Information:  Visits to Date 2   No Show/Cancelled Appts: 3  0      For today's appointment patient:  []  Cancelled  []  Rescheduled appointment  [x]  No-show   [x]  Called pt to remind of next appointment No answer was able to leave VM today, advised of x3 No Shows and no attendance x4 weeks. [facility]       Reason given by patient:  []  Patient ill  []  Conflicting appointment  []  No transportation    []  Conflict with work  []  No reason given  []  Other:        Comments:   Discharge per attendance Policy     Signature: Electronically signed by Clovis Price PTA on 22 at 10:02 AM EDT

## 2022-06-30 NOTE — PROGRESS NOTES
[address]  PHYSICAL THERAPY PLAN OF CARE   [facility].   [facility], [address] [place] [address]              Phone: [phone]  Fax: [phone]      [] Certification  [] Recertification []  Plan of Care  [] Progress Note [x] Discharge      Referring Provider: Arron Cruz MD   From:  Maty Johnson, PT, DPT  Patient: Gabby Parr (25 y.o. male) : 2000 Date: 2022   Medical Diagnosis: Low back pain, unspecified [M54.50]  Pain in thoracic spine [M54.6]    Treatment Diagnosis: thoracic pain, lumbar pain, poor postural awareness, decreased thoracolumbar AROM, decreased periscapular, lower abdominal, and trunk strength, and decreased HS flexibility    Progress Report Period from:  2022  to 2022    Visits to Date: 2 No Show: 3 Cancelled Appts: 0    OBJECTIVE:   Short Term Goals - Time Frame for Short term goals: 2-3 weeks    Goals Current/Discharge status  Status   Short term goal 1: The pt will demonstrate improved postural awareness requiring <25% VC's with exercises  Not Tested due to unexpected discharge due to poor attendance Unable to assess   Short term goal 2: The pt will have decreased thoracic and lumbar pain </=1/10 in order to increase ease with work duties  Not Tested due to unexpected discharge due to poor attendance Unable to assess     Long Term Goals - Time Frame for Long term goals : 4-6 weeks  Goals Current/ Discharge status Status   Long term goal 1: The pt will have a decrease in EDY score 0/50 points in order to increase functional activity tolerance Not Tested due to unexpected discharge due to poor attendance Unable to assess   Long term goal 2: The pt will demo improved thoracolumbar AROM WNL's all aplanes and B 90/90 HS flexibility </=25* in order to increase ease with work related standing and lifting activity Not Tested due to unexpected discharge due to poor attendance Unable to assess   Long term goal 3: The pt will demonstrate improved B periscapular strength >/=4+/5, lower abdominal strength >/=fair, and trunk extension strength WNL's in order to lift/carry without pain Not Tested due to unexpected discharge due to poor attendance Unable to assess   Long term goal 4: The pt will be indep/compliant with HEP in order to self-manage symptoms upon D/C Not Tested due to unexpected discharge due to poor attendance Unable to assess     Body Structures, Functions, Activity Limitations Requiring Skilled Therapeutic Intervention: Increased pain,Decreased posture,Decreased ADL status,Decreased ROM,Decreased body mechanics,Decreased strength,Decreased tolerance to work activity  Assessment: Pt failed to return to PT after 6/2/22 with x3 consecutive no shows, unable to determine functional outcomes d/t unexpected D/C per attendance policy  Therapy Prognosis: Poor d/t compliance    PLAN: D/C                   Patient Status:[] Continue/ Initiate plan of Care    [x] Discharge PT. Recommend pt continue with HEP. [] Additional visits requested, Please re-certify for additional visits:    [] Hold         Signature: Electronically signed by George Blake PT on 6/30/2022 at 11:41 AM  Objective information taken by: Electronically signed by Elisa Boo PTA on 6/30/22 at 10:08 AM EDT    If you have any questions or concerns, please don't hesitate to call. Thank you for your referral.    I have reviewed this plan of care and certify a need for medically necessary rehabilitation services.     Physician Signature:__________________________________________________________  Date:  Please sign and return

## 2022-07-14 ENCOUNTER — OFFICE VISIT (OUTPATIENT)
Dept: FAMILY MEDICINE CLINIC | Age: 22
End: 2022-07-14
Payer: COMMERCIAL

## 2022-07-14 VITALS
HEART RATE: 86 BPM | DIASTOLIC BLOOD PRESSURE: 78 MMHG | TEMPERATURE: 97.8 F | SYSTOLIC BLOOD PRESSURE: 120 MMHG | OXYGEN SATURATION: 98 % | WEIGHT: 219.8 LBS | BODY MASS INDEX: 30.77 KG/M2 | HEIGHT: 71 IN

## 2022-07-14 DIAGNOSIS — J44.9 OBSTRUCTIVE AIRWAY DISEASE (HCC): ICD-10-CM

## 2022-07-14 DIAGNOSIS — M54.50 MIDLINE LOW BACK PAIN WITHOUT SCIATICA, UNSPECIFIED CHRONICITY: Primary | ICD-10-CM

## 2022-07-14 DIAGNOSIS — K21.9 GASTROESOPHAGEAL REFLUX DISEASE WITHOUT ESOPHAGITIS: ICD-10-CM

## 2022-07-14 DIAGNOSIS — R06.02 SHORTNESS OF BREATH: ICD-10-CM

## 2022-07-14 DIAGNOSIS — M25.512 CHRONIC LEFT SHOULDER PAIN: ICD-10-CM

## 2022-07-14 DIAGNOSIS — L30.9 DERMATITIS: ICD-10-CM

## 2022-07-14 DIAGNOSIS — G43.919 INTRACTABLE MIGRAINE WITHOUT STATUS MIGRAINOSUS, UNSPECIFIED MIGRAINE TYPE: ICD-10-CM

## 2022-07-14 DIAGNOSIS — G89.29 CHRONIC LEFT SHOULDER PAIN: ICD-10-CM

## 2022-07-14 DIAGNOSIS — K58.9 IRRITABLE BOWEL SYNDROME, UNSPECIFIED TYPE: ICD-10-CM

## 2022-07-14 DIAGNOSIS — F41.9 ANXIETY: ICD-10-CM

## 2022-07-14 DIAGNOSIS — F43.22 ADJUSTMENT DISORDER WITH ANXIOUS MOOD: ICD-10-CM

## 2022-07-14 DIAGNOSIS — Z87.892 HISTORY OF ANAPHYLAXIS: ICD-10-CM

## 2022-07-14 PROCEDURE — G8417 CALC BMI ABV UP PARAM F/U: HCPCS | Performed by: FAMILY MEDICINE

## 2022-07-14 PROCEDURE — G8427 DOCREV CUR MEDS BY ELIG CLIN: HCPCS | Performed by: FAMILY MEDICINE

## 2022-07-14 PROCEDURE — 99214 OFFICE O/P EST MOD 30 MIN: CPT | Performed by: FAMILY MEDICINE

## 2022-07-14 PROCEDURE — 3023F SPIROM DOC REV: CPT | Performed by: FAMILY MEDICINE

## 2022-07-14 PROCEDURE — 1036F TOBACCO NON-USER: CPT | Performed by: FAMILY MEDICINE

## 2022-07-14 RX ORDER — FLUOXETINE 10 MG/1
10 CAPSULE ORAL DAILY
Qty: 30 CAPSULE | Refills: 3 | Status: SHIPPED | OUTPATIENT
Start: 2022-07-14 | End: 2022-10-27 | Stop reason: SDUPTHER

## 2022-07-14 RX ORDER — ALBUTEROL SULFATE 90 UG/1
2 AEROSOL, METERED RESPIRATORY (INHALATION) EVERY 6 HOURS PRN
Qty: 18 G | Refills: 3 | Status: SHIPPED | OUTPATIENT
Start: 2022-07-14 | End: 2022-10-27 | Stop reason: SDUPTHER

## 2022-07-14 RX ORDER — FAMOTIDINE 40 MG/1
20 TABLET, FILM COATED ORAL 2 TIMES DAILY PRN
Qty: 30 TABLET | Refills: 3 | Status: SHIPPED | OUTPATIENT
Start: 2022-07-14 | End: 2022-10-27 | Stop reason: SDUPTHER

## 2022-07-14 RX ORDER — SUMATRIPTAN 25 MG/1
25 TABLET, FILM COATED ORAL DAILY PRN
Qty: 9 TABLET | Refills: 3 | Status: SHIPPED | OUTPATIENT
Start: 2022-07-14 | End: 2022-10-27 | Stop reason: SDUPTHER

## 2022-07-14 RX ORDER — EPINEPHRINE 0.3 MG/.3ML
INJECTION SUBCUTANEOUS
Qty: 2 EACH | Refills: 1 | Status: SHIPPED | OUTPATIENT
Start: 2022-07-14 | End: 2022-10-27 | Stop reason: SDUPTHER

## 2022-07-14 RX ORDER — DICYCLOMINE HYDROCHLORIDE 10 MG/1
10 CAPSULE ORAL 4 TIMES DAILY
Qty: 120 CAPSULE | Refills: 3 | Status: SHIPPED | OUTPATIENT
Start: 2022-07-14 | End: 2022-10-27 | Stop reason: SDUPTHER

## 2022-07-14 ASSESSMENT — PATIENT HEALTH QUESTIONNAIRE - PHQ9
SUM OF ALL RESPONSES TO PHQ QUESTIONS 1-9: 0
SUM OF ALL RESPONSES TO PHQ QUESTIONS 1-9: 0
2. FEELING DOWN, DEPRESSED OR HOPELESS: 0
SUM OF ALL RESPONSES TO PHQ QUESTIONS 1-9: 0
1. LITTLE INTEREST OR PLEASURE IN DOING THINGS: 0
SUM OF ALL RESPONSES TO PHQ9 QUESTIONS 1 & 2: 0
SUM OF ALL RESPONSES TO PHQ QUESTIONS 1-9: 0

## 2022-07-14 NOTE — PROGRESS NOTES
Chief Complaint   Patient presents with    3 Month Follow-Up    Anxiety     Stable.  Headache     Doing better    Rash     Rash on side of abdomen that he notced 2 days ago. Itches in the heat. Denies pain. HPI: Arloa Boop Schildwachter 25 y.o. male presenting for    Back and leg pain   Last month patient was working and noticed his back snapped and was unable to move   patinet is taking the naproxen with no relief of symptoms   No radiculopathy   Does admits  some leg pain    F/u   Legs are doing better   Back is varying in terms of pain. Rash   On the right side of the abdomen   Does not itch   Worse with heat   Unsure when it started        Headache  Patient presents for evaluation of headache. Symptoms began about 2 weeks ago. Generally, the headaches last about all day months and occur continuously. The headaches all day. The headaches are usually squeezing and are located in both sides of the head, . The patient rates his most severe headaches a 7 on a scale from 1 to 10. Recently, the headaches have been stable. Work attendance or other daily activities are affected by the headaches. Precipitating factors include: none which have been determined. The headaches are usually not preceded by an aura. Associated neurologic symptoms: decreased physical activity. The patient denies decreased physical activity. Home treatment has included nothing with no improvement. Other history includes: nothing pertinent. Family history includes no known family members with significant headaches. F/u   Stable at this time. Uses imitrex as needed.         IBS follow up   Stable. Patient is doing better since starting the bentyl medication. Labs reviewed with patient and were negative.      F/u   Stopped taking the bentyl (old)  Was helping   Admits to some abdominal pain     Adjustment disorder/depression/anxiety  Patient reports he has been having depression, anxiety, stress lately after his godfather (mother's boyfriend) had passed away. Patient reports that he has been getting more stressed and has increased outbursts. Patient has been having a strained relationship with his girlfriend. Patient does admit to sad and depressed feelings but denies any suicidal homicidal ideations. Denies any auditory visual hallucinations. Patient has had a history of low moods depression for couple months now. Patient also has history of ADHD however is not on medication currently. F/u   reports that hte medication is helping. Denies any SI or HI. Stable. Per mother medication works when he is compliant. GERD   admits to acid reflux   Not food dependant but admits to increase burping. F/u   Has improved. Current Outpatient Medications   Medication Sig Dispense Refill    FLUoxetine (PROZAC) 10 MG capsule Take 1 capsule by mouth daily 30 capsule 3    SUMAtriptan (IMITREX) 25 MG tablet Take 1 tablet by mouth daily as needed for Migraine 9 tablet 3    albuterol sulfate HFA (PROVENTIL HFA) 108 (90 Base) MCG/ACT inhaler Inhale 2 puffs into the lungs every 6 hours as needed for Wheezing or Shortness of Breath Please cover what is under insurance. 18 g 3    fluticasone-salmeterol (ADVAIR HFA) 115-21 MCG/ACT inhaler Inhale 2 puffs into the lungs 2 times daily 1 each 3    naproxen (NAPROSYN) 500 MG tablet Take 1 tablet by mouth 2 times daily as needed for Pain Take with food 60 tablet 0    dicyclomine (BENTYL) 10 MG capsule Take 1 capsule by mouth 4 times daily 120 capsule 3    famotidine (PEPCID) 40 MG tablet Take 0.5 tablets by mouth 2 times daily as needed (GERD) 30 tablet 3    EPINEPHrine (EPIPEN) 0.3 MG/0.3ML SOAJ injection Use as directed for allergic reaction 2 each 1     No current facility-administered medications for this visit. ROS  CONSTITUTIONAL: The patient denies fevers, chills, sweats and body ache.   HEENT: admits to  headache that have improved,  Denies blurry vision, eye pain, tinnitus, vertigo,  sore throat, neck or thyroid masses. Denies nasal congestion  RESPIRATORY: admits to shortness of breath. CARDIAC: Denies chest pain, pressure, palpitations, Denies lower extremity edema. GASTROINTESTINAL: denies any abdominal pain. Denies any constipation or diarrhea   GENITOURINARY: Denies dysuria, hematuria, nocturia or frequency, urinary incontinence. NEUROLOGIC: Denies headaches, dizziness, syncope, weakness  MUSCULOSKELETAL: denies changes in range of motion, joint pain, stiffness. ENDOCRINOLOGY: Denies heat or cold intolerance. HEMATOLOGY: Denies easy bleeding or blood transfusion,anemia  DERMATOLOGY: rash on the right groin. PSYCHIATRY: Admits to depression, anxiety, stress that has improved.      Past Medical History:   Diagnosis Date    ADHD     Obstructive airway disease ([facility] 01/2022    PFT good response with brochodilator    Oppositional defiant disorder         Past Surgical History:   Procedure Laterality Date    ADENOIDECTOMY          Family History   Problem Relation Age of Onset    Stroke Father     Stroke Maternal Grandmother     Stroke Maternal Grandfather         Social History     Socioeconomic History    Marital status: Single     Spouse name: Not on file    Number of children: Not on file    Years of education: Not on file    Highest education level: Not on file   Occupational History    Occupation: high school   Tobacco Use    Smoking status: Never Smoker    Smokeless tobacco: Never Used   Vaping Use    Vaping Use: Never used   Substance and Sexual Activity    Alcohol use: Never    Drug use: Never    Sexual activity: Not on file   Other Topics Concern    Not on file   Social History Narrative    Not on file     Social Determinants of Health     Financial Resource Strain: Low Risk     Difficulty of Paying Living Expenses: Not hard at all   Food Insecurity: No Food Insecurity    Worried About Running Out of Food in the Last Year: Never true  Ran Out of Food in the Last Year: Never true   Transportation Needs:     Lack of Transportation (Medical): Not on file    Lack of Transportation (Non-Medical): Not on file   Physical Activity:     Days of Exercise per Week: Not on file    Minutes of Exercise per Session: Not on file   Stress:     Feeling of Stress : Not on file   Social Connections:     Frequency of Communication with Friends and Family: Not on file    Frequency of Social Gatherings with Friends and Family: Not on file    Attends [employer] Services: Not on file    Active Member of [address] [place] [address] [employer] or Organizations: Not on file    Attends Club or Organization Meetings: Not on file    Marital Status: Not on file   Intimate Partner Violence:     Fear of Current or Ex-Partner: Not on file    Emotionally Abused: Not on file    Physically Abused: Not on file    Sexually Abused: Not on file   Housing Stability:     Unable to Pay for Housing in the Last Year: Not on file    Number of [place] in the Last Year: Not on file    Unstable Housing in the Last Year: Not on file        /78   Pulse 86   Temp 97.8 °F (36.6 °C) (Temporal)   Ht 5' 11\" (1.803 m)   Wt 219 lb 12.8 oz (99.7 kg)   SpO2 98%   BMI 30.66 kg/m²        Physical Exam:    General appearance - alert, well appearing, and in no distress  Mental Status - alert, oriented to person, place, and time  Eyes - pupils equal and reactive, extraocular eye movements intact   Ears - bilateral TM's and external ear canals normal   Nose - normal and patent, no erythema, discharge or polyps   Sinuses - Normal paranasal sinuses without tenderness   Throat - mucous membranes moist, pharynx normal without lesions   Neck - supple, no significant adenopathy   Thyroid - thyroid is normal in size without nodules or tenderness    Chest -  Decreased air flow in the right lung fields. No wheezing. No rhonchi.    Heart - normal rate, regular rhythm, normal S1, S2, no murmurs, rubs, clicks or gallops  Abdomen - soft, nontender, nondistended, no masses or organomegaly   Back exam -paraspinal tenderness palpation. Mainly in the lumbar spine. Neurological - alert, oriented, normal speech, no focal findings or movement disorder noted   Musculoskeletal - no joint tenderness, deformity or swelling   Extremities - peripheral pulses normal, no pedal edema, no clubbing or cyanosis   Skin - erythematous papules on the right medial thigh and hip. Non blanchable. Non tender no drainage. Labs   No results found for: TSHREFLEX  TSH   Date Value Ref Range Status   07/29/2014 1.410 0.270 - 4.200 uIU/mL Final     Lab Results   Component Value Date     01/13/2022    K 4.1 01/13/2022     01/13/2022    CO2 24 01/13/2022    BUN 7 01/13/2022    CREATININE 0.93 01/13/2022    GLUCOSE 99 01/13/2022    CALCIUM 9.6 01/13/2022    PROT 7.5 01/13/2022    LABALBU 4.5 01/13/2022    BILITOT <0.2 01/13/2022    ALKPHOS 92 01/13/2022    AST 19 01/13/2022    ALT 23 01/13/2022    LABGLOM >60.0 01/13/2022    GFRAA >60.0 01/13/2022    GLOB 3.0 01/13/2022       Lab Results   Component Value Date    WBC 6.1 01/13/2022    HGB 15.0 01/13/2022    HCT 45.0 01/13/2022    MCV 89.0 01/13/2022     01/13/2022     Stool studies are negative. A/P: [place] 25 y.o. male presenting for    1. Irritable bowel syndrome, unspecified type    - dicyclomine (BENTYL) 10 MG capsule; Take 1 capsule by mouth 4 times daily  Dispense: 120 capsule; Refill: 3    2. Midline low back pain without sciatica, unspecified chronicity    - [facility] [place]    3. Anxiety    - FLUoxetine (PROZAC) 10 MG capsule; Take 1 capsule by mouth daily  Dispense: 30 capsule; Refill: 3    4. Intractable migraine without status migrainosus, unspecified migraine type    - SUMAtriptan (IMITREX) 25 MG tablet; Take 1 tablet by mouth daily as needed for Migraine  Dispense: 9 tablet; Refill: 3    5.  Chronic left shoulder pain    - [facility] [place]    6. Shortness of breath    - fluticasone-salmeterol (ADVAIR HFA) 115-21 MCG/ACT inhaler; Inhale 2 puffs into the lungs 2 times daily  Dispense: 1 each; Refill: 3  - albuterol sulfate HFA (PROVENTIL HFA) 108 (90 Base) MCG/ACT inhaler; Inhale 2 puffs into the lungs every 6 hours as needed for Wheezing or Shortness of Breath Please cover what is under insurance. Dispense: 18 g; Refill: 3    7. Obstructive airway disease (HCC)    - fluticasone-salmeterol (ADVAIR HFA) 115-21 MCG/ACT inhaler; Inhale 2 puffs into the lungs 2 times daily  Dispense: 1 each; Refill: 3  - albuterol sulfate HFA (PROVENTIL HFA) 108 (90 Base) MCG/ACT inhaler; Inhale 2 puffs into the lungs every 6 hours as needed for Wheezing or Shortness of Breath Please cover what is under insurance. Dispense: 18 g; Refill: 3    8. Adjustment disorder with anxious mood    - FLUoxetine (PROZAC) 10 MG capsule; Take 1 capsule by mouth daily  Dispense: 30 capsule; Refill: 3    9. migraine    - SUMAtriptan (IMITREX) 25 MG tablet; Take 1 tablet by mouth daily as needed for Migraine  Dispense: 9 tablet; Refill: 3    10. Gastroesophageal reflux disease without esophagitis    - famotidine (PEPCID) 40 MG tablet; Take 0.5 tablets by mouth 2 times daily as needed (GERD)  Dispense: 30 tablet; Refill: 3    11. Dermatitis    - hydrocortisone 2.5 % ointment; Apply topically 2 times daily x 7 days. Place on the affected area and a small amount at a time. Dispense: 20 g; Refill: 0    12. History of anaphylaxis    - EPINEPHrine (EPIPEN) 0.3 MG/0.3ML SOAJ injection; Use as directed for allergic reaction  Dispense: 2 each;  Refill: 1

## 2022-10-27 ENCOUNTER — OFFICE VISIT (OUTPATIENT)
Dept: FAMILY MEDICINE CLINIC | Age: 22
End: 2022-10-27
Payer: COMMERCIAL

## 2022-10-27 VITALS
OXYGEN SATURATION: 98 % | HEART RATE: 72 BPM | DIASTOLIC BLOOD PRESSURE: 80 MMHG | SYSTOLIC BLOOD PRESSURE: 120 MMHG | TEMPERATURE: 97.3 F | HEIGHT: 71 IN | BODY MASS INDEX: 30.1 KG/M2 | WEIGHT: 215 LBS

## 2022-10-27 DIAGNOSIS — R06.02 SHORTNESS OF BREATH: ICD-10-CM

## 2022-10-27 DIAGNOSIS — Z23 NEED FOR INFLUENZA VACCINATION: Primary | ICD-10-CM

## 2022-10-27 DIAGNOSIS — K21.9 GASTROESOPHAGEAL REFLUX DISEASE WITHOUT ESOPHAGITIS: ICD-10-CM

## 2022-10-27 DIAGNOSIS — Z87.892 HISTORY OF ANAPHYLAXIS: ICD-10-CM

## 2022-10-27 DIAGNOSIS — K58.9 IRRITABLE BOWEL SYNDROME, UNSPECIFIED TYPE: ICD-10-CM

## 2022-10-27 DIAGNOSIS — G43.919 INTRACTABLE MIGRAINE WITHOUT STATUS MIGRAINOSUS, UNSPECIFIED MIGRAINE TYPE: ICD-10-CM

## 2022-10-27 DIAGNOSIS — F41.9 ANXIETY: ICD-10-CM

## 2022-10-27 DIAGNOSIS — F43.22 ADJUSTMENT DISORDER WITH ANXIOUS MOOD: ICD-10-CM

## 2022-10-27 DIAGNOSIS — J44.9 OBSTRUCTIVE AIRWAY DISEASE (HCC): ICD-10-CM

## 2022-10-27 PROCEDURE — 3023F SPIROM DOC REV: CPT | Performed by: FAMILY MEDICINE

## 2022-10-27 PROCEDURE — 99214 OFFICE O/P EST MOD 30 MIN: CPT | Performed by: FAMILY MEDICINE

## 2022-10-27 PROCEDURE — 90471 IMMUNIZATION ADMIN: CPT | Performed by: FAMILY MEDICINE

## 2022-10-27 PROCEDURE — G8427 DOCREV CUR MEDS BY ELIG CLIN: HCPCS | Performed by: FAMILY MEDICINE

## 2022-10-27 PROCEDURE — 90674 CCIIV4 VAC NO PRSV 0.5 ML IM: CPT | Performed by: FAMILY MEDICINE

## 2022-10-27 PROCEDURE — 1036F TOBACCO NON-USER: CPT | Performed by: FAMILY MEDICINE

## 2022-10-27 PROCEDURE — G8484 FLU IMMUNIZE NO ADMIN: HCPCS | Performed by: FAMILY MEDICINE

## 2022-10-27 PROCEDURE — G8417 CALC BMI ABV UP PARAM F/U: HCPCS | Performed by: FAMILY MEDICINE

## 2022-10-27 RX ORDER — FAMOTIDINE 40 MG/1
20 TABLET, FILM COATED ORAL 2 TIMES DAILY PRN
Qty: 30 TABLET | Refills: 3 | Status: SHIPPED | OUTPATIENT
Start: 2022-10-27

## 2022-10-27 RX ORDER — FLUOXETINE 10 MG/1
10 CAPSULE ORAL DAILY
Qty: 30 CAPSULE | Refills: 3 | Status: SHIPPED | OUTPATIENT
Start: 2022-10-27

## 2022-10-27 RX ORDER — DICYCLOMINE HYDROCHLORIDE 10 MG/1
10 CAPSULE ORAL 4 TIMES DAILY
Qty: 120 CAPSULE | Refills: 3 | Status: SHIPPED | OUTPATIENT
Start: 2022-10-27

## 2022-10-27 RX ORDER — SUMATRIPTAN 25 MG/1
25 TABLET, FILM COATED ORAL DAILY PRN
Qty: 9 TABLET | Refills: 3 | Status: SHIPPED | OUTPATIENT
Start: 2022-10-27

## 2022-10-27 RX ORDER — ALBUTEROL SULFATE 90 UG/1
2 AEROSOL, METERED RESPIRATORY (INHALATION) EVERY 6 HOURS PRN
Qty: 18 G | Refills: 3 | Status: SHIPPED | OUTPATIENT
Start: 2022-10-27

## 2022-10-27 RX ORDER — EPINEPHRINE 0.3 MG/.3ML
INJECTION SUBCUTANEOUS
Qty: 2 EACH | Refills: 1 | Status: SHIPPED | OUTPATIENT
Start: 2022-10-27

## 2022-10-27 NOTE — PROGRESS NOTES
Chief Complaint   Patient presents with    3 Month Follow-Up    Anxiety     Pt states anxiety has been good. Headache     Head is fine today. Rash     Rash is better. Depression     Mom is concerned if pt has depression. Flu Vaccine     Yes to flu vacc. HPI: Jackson Reese Schildwachter 25 y.o. male presenting for    Back and leg pain   Last month patient was working and noticed his back snapped and was unable to move   patinet is taking the naproxen with no relief of symptoms   No radiculopathy   Does admits  some leg pain    F/u   Stable no issues or concerns. Headache  Patient presents for evaluation of headache. Symptoms began about 2 weeks ago. Generally, the headaches last about all day months and occur continuously. The headaches all day. The headaches are usually squeezing and are located in both sides of the head, . The patient rates his most severe headaches a 7 on a scale from 1 to 10. Recently, the headaches have been stable. Work attendance or other daily activities are affected by the headaches. Precipitating factors include: none which have been determined. The headaches are usually not preceded by an aura. Associated neurologic symptoms: decreased physical activity. The patient denies decreased physical activity. Home treatment has included nothing with no improvement. Other history includes: nothing pertinent. Family history includes no known family members with significant headaches. F/u   Stable at this time. Uses imitrex as needed. IBS follow up   Stable. Patient is doing better since starting the bentyl medication. Labs reviewed with patient and were negative. F/u   Stopped taking the bentyl (old)  Was helping   Admits to some abdominal pain     Adjustment disorder/depression/anxiety  Patient reports he has been having depression, anxiety, stress lately after his godfather (mother's boyfriend) had passed away.   Patient reports that he has been getting more admits to shortness of breath. CARDIAC: Denies chest pain, pressure, palpitations, Denies lower extremity edema. GASTROINTESTINAL: denies any abdominal pain. Denies any constipation or diarrhea   GENITOURINARY: Denies dysuria, hematuria, nocturia or frequency, urinary incontinence. NEUROLOGIC: Denies headaches, dizziness, syncope, weakness  MUSCULOSKELETAL: denies changes in range of motion, joint pain, stiffness. ENDOCRINOLOGY: Denies heat or cold intolerance. HEMATOLOGY: Denies easy bleeding or blood transfusion,anemia  DERMATOLOGY: Admits to pimple/scabs on his forehead. PSYCHIATRY: Admits to depression, anxiety, stress that has improved.      Past Medical History:   Diagnosis Date    ADHD     Obstructive airway disease ([facility] [address] 01/2022    PFT good response with brochodilator    Oppositional defiant disorder         Past Surgical History:   Procedure Laterality Date    ADENOIDECTOMY          Family History   Problem Relation Age of Onset    Stroke Father     Stroke Maternal Grandmother     Stroke Maternal Grandfather         Social History     Socioeconomic History    Marital status: Single     Spouse name: Not on file    Number of children: Not on file    Years of education: Not on file    Highest education level: Not on file   Occupational History    Occupation: high school   Tobacco Use    Smoking status: Never    Smokeless tobacco: Never   Vaping Use    Vaping Use: Never used   Substance and Sexual Activity    Alcohol use: Never    Drug use: Never    Sexual activity: Not on file   Other Topics Concern    Not on file   Social History Narrative    Not on file     Social Determinants of Health     Financial Resource Strain: Low Risk     Difficulty of Paying Living Expenses: Not hard at all   Food Insecurity: No Food Insecurity    Worried About Running Out of Food in the Last Year: Never true    Ran Out of Food in the Last Year: Never true   Transportation Needs: Not on file   Physical Activity: Not on file Stress: Not on file   Social Connections: Not on file   Intimate Partner Violence: Not on file   Housing Stability: Not on file        /80   Pulse 72   Temp 97.3 °F (36.3 °C) (Temporal)   Ht 5' 11\" (1.803 m)   Wt 215 lb (97.5 kg)   SpO2 98%   BMI 29.99 kg/m²        Physical Exam:    General appearance - alert, well appearing, and in no distress  Mental Status - alert, oriented to person, place, and time  Eyes - pupils equal and reactive, extraocular eye movements intact   Ears - bilateral TM's and external ear canals normal   Nose - normal and patent, no erythema, discharge or polyps   Sinuses - Normal paranasal sinuses without tenderness   Throat - mucous membranes moist, pharynx normal without lesions   Neck - supple, no significant adenopathy   Thyroid - thyroid is normal in size without nodules or tenderness    Chest -  Decreased air flow in the right lung fields. No wheezing. No rhonchi. Heart - normal rate, regular rhythm, normal S1, S2, no murmurs, rubs, clicks or gallops  Abdomen - soft, nontender, nondistended, no masses or organomegaly   Back exam -paraspinal tenderness palpation. Mainly in the lumbar spine. Neurological - alert, oriented, normal speech, no focal findings or movement disorder noted   Musculoskeletal - no joint tenderness, deformity or swelling   Extremities - peripheral pulses normal, no pedal edema, no clubbing or cyanosis   Skin -excoriations noted on the forehead. No signs of discharge or surrounding erythema.       Labs   No results found for: TSHREFLEX  TSH   Date Value Ref Range Status   07/29/2014 1.410 0.270 - 4.200 uIU/mL Final     Lab Results   Component Value Date     01/13/2022    K 4.1 01/13/2022     01/13/2022    CO2 24 01/13/2022    BUN 7 01/13/2022    CREATININE 0.93 01/13/2022    GLUCOSE 99 01/13/2022    CALCIUM 9.6 01/13/2022    PROT 7.5 01/13/2022    LABALBU 4.5 01/13/2022    BILITOT <0.2 01/13/2022    ALKPHOS 92 01/13/2022    AST 19 01/13/2022    ALT 23 01/13/2022    LABGLOM >60.0 01/13/2022    GFRAA >60.0 01/13/2022    GLOB 3.0 01/13/2022       Lab Results   Component Value Date    WBC 6.1 01/13/2022    HGB 15.0 01/13/2022    HCT 45.0 01/13/2022    MCV 89.0 01/13/2022     01/13/2022     Stool studies are negative. A/P: Desirae Splinter 25 y.o. male presenting for    1. Anxiety    - FLUoxetine (PROZAC) 10 MG capsule; Take 1 capsule by mouth daily  Dispense: 30 capsule; Refill: 3    2. Adjustment disorder with anxious mood    - FLUoxetine (PROZAC) 10 MG capsule; Take 1 capsule by mouth daily  Dispense: 30 capsule; Refill: 3    3. migraine    - SUMAtriptan (IMITREX) 25 MG tablet; Take 1 tablet by mouth daily as needed for Migraine  Dispense: 9 tablet; Refill: 3    4. Shortness of breath    - albuterol sulfate HFA (PROVENTIL HFA) 108 (90 Base) MCG/ACT inhaler; Inhale 2 puffs into the lungs every 6 hours as needed for Wheezing or Shortness of Breath Please cover what is under insurance. Dispense: 18 g; Refill: 3  - fluticasone-salmeterol (ADVAIR HFA) 115-21 MCG/ACT inhaler; Inhale 2 puffs into the lungs 2 times daily  Dispense: 1 each; Refill: 3    5. Obstructive airway disease (HCC)    - albuterol sulfate HFA (PROVENTIL HFA) 108 (90 Base) MCG/ACT inhaler; Inhale 2 puffs into the lungs every 6 hours as needed for Wheezing or Shortness of Breath Please cover what is under insurance. Dispense: 18 g; Refill: 3  - fluticasone-salmeterol (ADVAIR HFA) 115-21 MCG/ACT inhaler; Inhale 2 puffs into the lungs 2 times daily  Dispense: 1 each; Refill: 3    6. Gastroesophageal reflux disease without esophagitis    - famotidine (PEPCID) 40 MG tablet; Take 0.5 tablets by mouth 2 times daily as needed (GERD)  Dispense: 30 tablet; Refill: 3    7. Irritable bowel syndrome, unspecified type    - dicyclomine (BENTYL) 10 MG capsule; Take 1 capsule by mouth 4 times daily  Dispense: 120 capsule; Refill: 3    8.  History of anaphylaxis    - EPINEPHrine (EPIPEN) 0.3 MG/0.3ML SOAJ injection; Use as directed for allergic reaction  Dispense: 2 each;  Refill: 1

## 2022-11-03 ENCOUNTER — IMMUNIZATION (OUTPATIENT)
Dept: FAMILY MEDICINE CLINIC | Age: 22
End: 2022-11-03
Payer: COMMERCIAL

## 2022-11-03 PROCEDURE — 0134A COVID-19, MODERNA BIVALENT BOOSTER, (AGE 12Y+), IM, 50 MCG/0.5 ML: CPT | Performed by: FAMILY MEDICINE

## 2022-11-03 PROCEDURE — 91313 COVID-19, MODERNA BIVALENT BOOSTER, (AGE 12Y+), IM, 50 MCG/0.5 ML: CPT | Performed by: FAMILY MEDICINE

## 2023-02-23 ENCOUNTER — OFFICE VISIT (OUTPATIENT)
Dept: FAMILY MEDICINE CLINIC | Age: 23
End: 2023-02-23
Payer: COMMERCIAL

## 2023-02-23 VITALS
WEIGHT: 225 LBS | DIASTOLIC BLOOD PRESSURE: 80 MMHG | SYSTOLIC BLOOD PRESSURE: 126 MMHG | HEART RATE: 87 BPM | HEIGHT: 71 IN | OXYGEN SATURATION: 98 % | BODY MASS INDEX: 31.5 KG/M2 | TEMPERATURE: 97.4 F

## 2023-02-23 DIAGNOSIS — K58.9 IRRITABLE BOWEL SYNDROME, UNSPECIFIED TYPE: ICD-10-CM

## 2023-02-23 DIAGNOSIS — J44.9 OBSTRUCTIVE AIRWAY DISEASE (HCC): ICD-10-CM

## 2023-02-23 DIAGNOSIS — G43.919 INTRACTABLE MIGRAINE WITHOUT STATUS MIGRAINOSUS, UNSPECIFIED MIGRAINE TYPE: ICD-10-CM

## 2023-02-23 DIAGNOSIS — R06.02 SHORTNESS OF BREATH: ICD-10-CM

## 2023-02-23 DIAGNOSIS — K21.9 GASTROESOPHAGEAL REFLUX DISEASE WITHOUT ESOPHAGITIS: ICD-10-CM

## 2023-02-23 DIAGNOSIS — F43.22 ADJUSTMENT DISORDER WITH ANXIOUS MOOD: ICD-10-CM

## 2023-02-23 DIAGNOSIS — F41.9 ANXIETY: ICD-10-CM

## 2023-02-23 PROCEDURE — 1036F TOBACCO NON-USER: CPT | Performed by: FAMILY MEDICINE

## 2023-02-23 PROCEDURE — 3023F SPIROM DOC REV: CPT | Performed by: FAMILY MEDICINE

## 2023-02-23 PROCEDURE — G8427 DOCREV CUR MEDS BY ELIG CLIN: HCPCS | Performed by: FAMILY MEDICINE

## 2023-02-23 PROCEDURE — G8482 FLU IMMUNIZE ORDER/ADMIN: HCPCS | Performed by: FAMILY MEDICINE

## 2023-02-23 PROCEDURE — 99214 OFFICE O/P EST MOD 30 MIN: CPT | Performed by: FAMILY MEDICINE

## 2023-02-23 PROCEDURE — G8417 CALC BMI ABV UP PARAM F/U: HCPCS | Performed by: FAMILY MEDICINE

## 2023-02-23 RX ORDER — FAMOTIDINE 40 MG/1
20 TABLET, FILM COATED ORAL 2 TIMES DAILY PRN
Qty: 30 TABLET | Refills: 3 | Status: SHIPPED | OUTPATIENT
Start: 2023-02-23

## 2023-02-23 RX ORDER — FLUOXETINE 10 MG/1
10 CAPSULE ORAL DAILY
Qty: 30 CAPSULE | Refills: 3 | Status: SHIPPED | OUTPATIENT
Start: 2023-02-23

## 2023-02-23 RX ORDER — NAPROXEN 500 MG/1
500 TABLET ORAL 2 TIMES DAILY PRN
Qty: 60 TABLET | Refills: 0 | Status: SHIPPED | OUTPATIENT
Start: 2023-02-23

## 2023-02-23 RX ORDER — SUMATRIPTAN 25 MG/1
25 TABLET, FILM COATED ORAL DAILY PRN
Qty: 9 TABLET | Refills: 3 | Status: SHIPPED | OUTPATIENT
Start: 2023-02-23

## 2023-02-23 RX ORDER — DICYCLOMINE HCL 20 MG
20 TABLET ORAL 3 TIMES DAILY PRN
Qty: 90 TABLET | Refills: 2 | COMMUNITY
Start: 2023-02-23

## 2023-02-23 RX ORDER — ALBUTEROL SULFATE 90 UG/1
2 AEROSOL, METERED RESPIRATORY (INHALATION) EVERY 6 HOURS PRN
Qty: 18 G | Refills: 3 | Status: SHIPPED | OUTPATIENT
Start: 2023-02-23

## 2023-02-23 SDOH — ECONOMIC STABILITY: FOOD INSECURITY: WITHIN THE PAST 12 MONTHS, YOU WORRIED THAT YOUR FOOD WOULD RUN OUT BEFORE YOU GOT MONEY TO BUY MORE.: NEVER TRUE

## 2023-02-23 SDOH — ECONOMIC STABILITY: HOUSING INSECURITY
IN THE LAST 12 MONTHS, WAS THERE A TIME WHEN YOU DID NOT HAVE A STEADY PLACE TO SLEEP OR SLEPT IN A SHELTER (INCLUDING NOW)?: NO

## 2023-02-23 SDOH — ECONOMIC STABILITY: INCOME INSECURITY: HOW HARD IS IT FOR YOU TO PAY FOR THE VERY BASICS LIKE FOOD, HOUSING, MEDICAL CARE, AND HEATING?: NOT HARD AT ALL

## 2023-02-23 SDOH — ECONOMIC STABILITY: FOOD INSECURITY: WITHIN THE PAST 12 MONTHS, THE FOOD YOU BOUGHT JUST DIDN'T LAST AND YOU DIDN'T HAVE MONEY TO GET MORE.: NEVER TRUE

## 2023-02-23 ASSESSMENT — PATIENT HEALTH QUESTIONNAIRE - PHQ9
SUM OF ALL RESPONSES TO PHQ QUESTIONS 1-9: 0
2. FEELING DOWN, DEPRESSED OR HOPELESS: 0
SUM OF ALL RESPONSES TO PHQ9 QUESTIONS 1 & 2: 0
1. LITTLE INTEREST OR PLEASURE IN DOING THINGS: 0

## 2023-02-23 NOTE — PROGRESS NOTES
Chief Complaint   Patient presents with    3 Month Follow-Up    Anxiety     Stable    Depression     Stable    Abdominal Pain     Pt having abdominal pain, takes bentyl but not as much as prescribed. HPI: Volanda Bran Schildwachter 25 y.o. male presenting for    Back and leg pain   Last month patient was working and noticed his back snapped and was unable to move   patinet is taking the naproxen with no relief of symptoms   No radiculopathy   Does admits  some leg pain    F/u   Stable no issues or concerns. Headache  Patient presents for evaluation of headache. Symptoms began about 2 weeks ago. Generally, the headaches last about all day months and occur continuously. The headaches all day. The headaches are usually squeezing and are located in both sides of the head, . The patient rates his most severe headaches a 7 on a scale from 1 to 10. Recently, the headaches have been stable. Work attendance or other daily activities are affected by the headaches. Precipitating factors include: none which have been determined. The headaches are usually not preceded by an aura. Associated neurologic symptoms: decreased physical activity. The patient denies decreased physical activity. Home treatment has included nothing with no improvement. Other history includes: nothing pertinent. Family history includes no known family members with significant headaches. F/u   Stable at this time. Uses imitrex as needed. IBS follow up   Stable. Patient is doing better since starting the bentyl medication. Labs reviewed with patient and were negative. F/u   Increased IBS   Bentyl helps but does not take the pain completely. Uses the medication sparingly. Was helping   Admits to some abdominal pain     Adjustment disorder/depression/anxiety  Patient reports he has been having depression, anxiety, stress lately after his godfather (mother's boyfriend) had passed away.   Patient reports that he has been getting more stressed and has increased outbursts. Patient has been having a strained relationship with his girlfriend. Patient does admit to sad and depressed feelings but denies any suicidal homicidal ideations. Denies any auditory visual hallucinations. Patient has had a history of low moods depression for couple months now. Patient also has history of ADHD however is not on medication currently. F/u   reports that hte medication is helping. Denies any SI or HI. Stable. Per mother medication works when he is compliant. GERD   admits to acid reflux   Not food dependant but admits to increase burping. F/u   Has improved. Current Outpatient Medications   Medication Sig Dispense Refill    FLUoxetine (PROZAC) 10 MG capsule Take 1 capsule by mouth daily 30 capsule 3    SUMAtriptan (IMITREX) 25 MG tablet Take 1 tablet by mouth daily as needed for Migraine 9 tablet 3    albuterol sulfate HFA (PROVENTIL HFA) 108 (90 Base) MCG/ACT inhaler Inhale 2 puffs into the lungs every 6 hours as needed for Wheezing or Shortness of Breath Please cover what is under insurance. 18 g 3    famotidine (PEPCID) 40 MG tablet Take 0.5 tablets by mouth 2 times daily as needed (GERD) 30 tablet 3    dicyclomine (BENTYL) 10 MG capsule Take 1 capsule by mouth 4 times daily 120 capsule 3    fluticasone-salmeterol (ADVAIR HFA) 115-21 MCG/ACT inhaler Inhale 2 puffs into the lungs 2 times daily 1 each 3    EPINEPHrine (EPIPEN) 0.3 MG/0.3ML SOAJ injection Use as directed for allergic reaction 2 each 1    naproxen (NAPROSYN) 500 MG tablet Take 1 tablet by mouth 2 times daily as needed for Pain Take with food 60 tablet 0     No current facility-administered medications for this visit. ROS  CONSTITUTIONAL: The patient denies fevers, chills, sweats and body ache. HEENT: admits to  headache that have improved,  Denies blurry vision, eye pain, tinnitus, vertigo,  sore throat, neck or thyroid masses.   Denies nasal congestion  RESPIRATORY: admits to shortness of breath. CARDIAC: Denies chest pain, pressure, palpitations, Denies lower extremity edema. GASTROINTESTINAL: denies any abdominal pain. Denies any constipation or diarrhea   GENITOURINARY: Denies dysuria, hematuria, nocturia or frequency, urinary incontinence. NEUROLOGIC: Denies headaches, dizziness, syncope, weakness  MUSCULOSKELETAL: denies changes in range of motion, joint pain, stiffness. ENDOCRINOLOGY: Denies heat or cold intolerance. HEMATOLOGY: Denies easy bleeding or blood transfusion,anemia  DERMATOLOGY: Admits to pimple/scabs on his forehead. PSYCHIATRY: Admits to depression, anxiety, stress that has improved.      Past Medical History:   Diagnosis Date    ADHD     Obstructive airway disease ([facility] [address] 01/2022    PFT good response with brochodilator    Oppositional defiant disorder         Past Surgical History:   Procedure Laterality Date    ADENOIDECTOMY          Family History   Problem Relation Age of Onset    Stroke Father     Stroke Maternal Grandmother     Stroke Maternal Grandfather         Social History     Socioeconomic History    Marital status: Single     Spouse name: Not on file    Number of children: Not on file    Years of education: Not on file    Highest education level: Not on file   Occupational History    Occupation: high school   Tobacco Use    Smoking status: Never    Smokeless tobacco: Never   Vaping Use    Vaping Use: Never used   Substance and Sexual Activity    Alcohol use: Never    Drug use: Never    Sexual activity: Not on file   Other Topics Concern    Not on file   Social History Narrative    Not on file     Social Determinants of Health     Financial Resource Strain: Low Risk     Difficulty of Paying Living Expenses: Not hard at all   Food Insecurity: No Food Insecurity    Worried About Running Out of Food in the Last Year: Never true    [address] in the Last Year: Never true   Transportation Needs: Unknown    Lack of Transportation (Medical): Not on file    Lack of Transportation (Non-Medical): No   Physical Activity: Not on file   Stress: Not on file   Social Connections: Not on file   Intimate Partner Violence: Not on file   Housing Stability: Unknown    Unable to Pay for Housing in the Last Year: Not on file    Number of Places Lived in the Last Year: Not on file    Unstable Housing in the Last Year: No        /80   Pulse 87   Temp 97.4 °F (36.3 °C) (Temporal)   Ht 5' 10.5\" (1.791 m)   Wt 225 lb (102.1 kg)   SpO2 98%   BMI 31.83 kg/m²        Physical Exam:    General appearance - alert, well appearing, and in no distress  Mental Status - alert, oriented to person, place, and time  Eyes - pupils equal and reactive, extraocular eye movements intact   Ears - bilateral TM's and external ear canals normal   Nose - normal and patent, no erythema, discharge or polyps   Sinuses - Normal paranasal sinuses without tenderness   Throat - mucous membranes moist, pharynx normal without lesions   Neck - supple, no significant adenopathy   Thyroid - thyroid is normal in size without nodules or tenderness    Chest -  Decreased air flow in the right lung fields. No wheezing. No rhonchi. Heart - normal rate, regular rhythm, normal S1, S2, no murmurs, rubs, clicks or gallops  Abdomen - soft, nontender, nondistended, no masses or organomegaly   Back exam -paraspinal tenderness palpation. Mainly in the lumbar spine. Neurological - alert, oriented, normal speech, no focal findings or movement disorder noted   Musculoskeletal - no joint tenderness, deformity or swelling   Extremities - peripheral pulses normal, no pedal edema, no clubbing or cyanosis   Skin -excoriations noted on the forehead. No signs of discharge or surrounding erythema.       Labs   No results found for: TSHREFLEX  TSH   Date Value Ref Range Status   07/29/2014 1.410 0.270 - 4.200 uIU/mL Final     Lab Results   Component Value Date     01/13/2022    K 4.1 01/13/2022     01/13/2022    CO2 24 01/13/2022    BUN 7 01/13/2022    CREATININE 0.93 01/13/2022    GLUCOSE 99 01/13/2022    CALCIUM 9.6 01/13/2022    PROT 7.5 01/13/2022    LABALBU 4.5 01/13/2022    BILITOT <0.2 01/13/2022    ALKPHOS 92 01/13/2022    AST 19 01/13/2022    ALT 23 01/13/2022    LABGLOM >60.0 01/13/2022    GFRAA >60.0 01/13/2022    GLOB 3.0 01/13/2022       Lab Results   Component Value Date    WBC 6.1 01/13/2022    HGB 15.0 01/13/2022    HCT 45.0 01/13/2022    MCV 89.0 01/13/2022     01/13/2022     Stool studies are negative. A/P: Jessica Camargo 25 y.o. male presenting for        1. Irritable bowel syndrome, unspecified type  Increased the bentyl. Use as needed  - dicyclomine (BENTYL) 20 MG tablet; Take 1 tablet by mouth 3 times daily as needed (abdominal pain)  Dispense: 90 tablet; Refill: 2    2. Anxiety  Stable. Denies any Si or hI   - FLUoxetine (PROZAC) 10 MG capsule; Take 1 capsule by mouth daily  Dispense: 30 capsule; Refill: 3    3. Adjustment disorder with anxious mood    - FLUoxetine (PROZAC) 10 MG capsule; Take 1 capsule by mouth daily  Dispense: 30 capsule; Refill: 3    4. Shortness of breath    - albuterol sulfate HFA (PROVENTIL HFA) 108 (90 Base) MCG/ACT inhaler; Inhale 2 puffs into the lungs every 6 hours as needed for Wheezing or Shortness of Breath Please cover what is under insurance. Dispense: 18 g; Refill: 3  - fluticasone-salmeterol (ADVAIR HFA) 115-21 MCG/ACT inhaler; Inhale 2 puffs into the lungs 2 times daily  Dispense: 1 each; Refill: 3    5. Obstructive airway disease (HCC)    - albuterol sulfate HFA (PROVENTIL HFA) 108 (90 Base) MCG/ACT inhaler; Inhale 2 puffs into the lungs every 6 hours as needed for Wheezing or Shortness of Breath Please cover what is under insurance. Dispense: 18 g; Refill: 3  - fluticasone-salmeterol (ADVAIR HFA) 115-21 MCG/ACT inhaler; Inhale 2 puffs into the lungs 2 times daily  Dispense: 1 each; Refill: 3    6.  Gastroesophageal reflux disease without esophagitis    - famotidine (PEPCID) 40 MG tablet; Take 0.5 tablets by mouth 2 times daily as needed (GERD)  Dispense: 30 tablet; Refill: 3    7. migraine    - naproxen (NAPROSYN) 500 MG tablet; Take 1 tablet by mouth 2 times daily as needed for Pain Take with food  Dispense: 60 tablet; Refill: 0  - SUMAtriptan (IMITREX) 25 MG tablet; Take 1 tablet by mouth daily as needed for Migraine  Dispense: 9 tablet;  Refill: 3

## 2023-05-13 ENCOUNTER — HOSPITAL ENCOUNTER (EMERGENCY)
Age: 23
Discharge: HOME | End: 2023-05-13
Attending: FAMILY MEDICINE
Payer: COMMERCIAL

## 2023-05-13 ENCOUNTER — APPOINTMENT (OUTPATIENT)
Dept: GENERAL RADIOLOGY | Age: 23
End: 2023-05-13
Payer: COMMERCIAL

## 2023-05-13 VITALS
SYSTOLIC BLOOD PRESSURE: 130 MMHG | OXYGEN SATURATION: 97 % | TEMPERATURE: 98.3 F | RESPIRATION RATE: 18 BRPM | BODY MASS INDEX: 31.5 KG/M2 | HEIGHT: 70 IN | DIASTOLIC BLOOD PRESSURE: 80 MMHG | WEIGHT: 220 LBS | HEART RATE: 86 BPM

## 2023-05-13 DIAGNOSIS — F41.9 ANXIETY: Primary | ICD-10-CM

## 2023-05-13 LAB
ALBUMIN SERPL-MCNC: 4.6 G/DL (ref 3.5–4.6)
ALP SERPL-CCNC: 87 U/L (ref 35–104)
ALT SERPL-CCNC: 17 U/L (ref 0–41)
ANION GAP SERPL CALCULATED.3IONS-SCNC: 12 MEQ/L (ref 9–15)
AST SERPL-CCNC: 17 U/L (ref 0–40)
BASOPHILS # BLD: 0 K/UL (ref 0–0.2)
BASOPHILS NFR BLD: 0.5 %
BILIRUB SERPL-MCNC: 0.4 MG/DL (ref 0.2–0.7)
BUN SERPL-MCNC: 9 MG/DL (ref 6–20)
CALCIUM SERPL-MCNC: 9 MG/DL (ref 8.5–9.9)
CHLORIDE SERPL-SCNC: 101 MEQ/L (ref 95–107)
CO2 SERPL-SCNC: 25 MEQ/L (ref 20–31)
CREAT SERPL-MCNC: 1.01 MG/DL (ref 0.7–1.2)
D DIMER PPP FEU-MCNC: 0.31 MG/L FEU (ref 0–0.5)
EKG ATRIAL RATE: 89 BPM
EKG P AXIS: 47 DEGREES
EKG P-R INTERVAL: 140 MS
EKG Q-T INTERVAL: 370 MS
EKG QRS DURATION: 82 MS
EKG QTC CALCULATION (BAZETT): 450 MS
EKG R AXIS: 36 DEGREES
EKG T AXIS: 16 DEGREES
EKG VENTRICULAR RATE: 89 BPM
EOSINOPHIL # BLD: 0.2 K/UL (ref 0–0.7)
EOSINOPHIL NFR BLD: 2.6 %
ERYTHROCYTE [DISTWIDTH] IN BLOOD BY AUTOMATED COUNT: 13.4 % (ref 11.5–14.5)
GLOBULIN SER CALC-MCNC: 2.6 G/DL (ref 2.3–3.5)
GLUCOSE SERPL-MCNC: 99 MG/DL (ref 70–99)
HCT VFR BLD AUTO: 42.1 % (ref 42–52)
HGB BLD-MCNC: 13.8 G/DL (ref 14–18)
LYMPHOCYTES # BLD: 1.4 K/UL (ref 1–4.8)
LYMPHOCYTES NFR BLD: 18.7 %
MAGNESIUM SERPL-MCNC: 2 MG/DL (ref 1.7–2.4)
MCH RBC QN AUTO: 28.8 PG (ref 27–31.3)
MCHC RBC AUTO-ENTMCNC: 32.8 % (ref 33–37)
MCV RBC AUTO: 87.8 FL (ref 79–92.2)
MONOCYTES # BLD: 0.7 K/UL (ref 0.2–0.8)
MONOCYTES NFR BLD: 9.3 %
NEUTROPHILS # BLD: 5.1 K/UL (ref 1.4–6.5)
NEUTS SEG NFR BLD: 68.9 %
PLATELET # BLD AUTO: 298 K/UL (ref 130–400)
POTASSIUM SERPL-SCNC: 3.4 MEQ/L (ref 3.4–4.9)
PROT SERPL-MCNC: 7.2 G/DL (ref 6.3–8)
RBC # BLD AUTO: 4.8 M/UL (ref 4.7–6.1)
SODIUM SERPL-SCNC: 138 MEQ/L (ref 135–144)
TROPONIN T SERPL-MCNC: <0.01 NG/ML (ref 0–0.01)
WBC # BLD AUTO: 7.5 K/UL (ref 4.8–10.8)

## 2023-05-13 PROCEDURE — 80053 COMPREHEN METABOLIC PANEL: CPT

## 2023-05-13 PROCEDURE — 83735 ASSAY OF MAGNESIUM: CPT

## 2023-05-13 PROCEDURE — 85379 FIBRIN DEGRADATION QUANT: CPT

## 2023-05-13 PROCEDURE — 85025 COMPLETE CBC W/AUTO DIFF WBC: CPT

## 2023-05-13 PROCEDURE — 99285 EMERGENCY DEPT VISIT HI MDM: CPT

## 2023-05-13 PROCEDURE — 36415 COLL VENOUS BLD VENIPUNCTURE: CPT

## 2023-05-13 PROCEDURE — 84484 ASSAY OF TROPONIN QUANT: CPT

## 2023-05-13 PROCEDURE — 71046 X-RAY EXAM CHEST 2 VIEWS: CPT

## 2023-05-13 ASSESSMENT — PAIN DESCRIPTION - LOCATION: LOCATION: CHEST;HEAD

## 2023-05-13 ASSESSMENT — LIFESTYLE VARIABLES
HOW MANY STANDARD DRINKS CONTAINING ALCOHOL DO YOU HAVE ON A TYPICAL DAY: PATIENT DOES NOT DRINK
HOW OFTEN DO YOU HAVE A DRINK CONTAINING ALCOHOL: NEVER

## 2023-05-13 ASSESSMENT — PAIN - FUNCTIONAL ASSESSMENT: PAIN_FUNCTIONAL_ASSESSMENT: 0-10

## 2023-05-13 ASSESSMENT — PAIN DESCRIPTION - PAIN TYPE: TYPE: ACUTE PAIN

## 2023-05-13 ASSESSMENT — PAIN SCALES - GENERAL: PAINLEVEL_OUTOF10: 8

## 2023-05-13 NOTE — ED NOTES
Patient given DC instructions education   To fu with pcp   Up with steady gait at time of [address], RN  05/13/23 1380
Improved

## 2023-05-13 NOTE — ED PROVIDER NOTES
normal.           RESULTS     EKG: All EKG's are interpreted by the Emergency Department Physician who either signs or Co-signsthis chart in the absence of a cardiologist.    EKG: normal EKG, normal sinus rhythm. RADIOLOGY:   Non-plain filmimages such as CT, Ultrasound and MRI are read by the radiologist. Plain radiographic images are visualized and preliminarily interpreted by the emergency physician with the below findings:      Interpretation per the Radiologist below, if available at the time ofthis note:    XR CHEST (2 VW)   Final Result   No acute disease. ED BEDSIDE ULTRASOUND:   Performed by ED Physician - none    LABS:  Labs Reviewed   CBC WITH AUTO DIFFERENTIAL - Abnormal; Notable for the following components:       Result Value    Hemoglobin 13.8 (*)     MCHC 32.8 (*)     All other components within normal limits   COMPREHENSIVE METABOLIC PANEL W/ REFLEX TO MG FOR LOW K   D-DIMER, QUANTITATIVE   TROPONIN   MAGNESIUM       All other labs were within normal range or not returned as of this dictation. Procedures      Medical Decision Making  Presented with anxiety and shortness of breath CBC CMP normal EKG troponin negative chest x-ray negative D-dimer negative patient hemodynamically stable slightly 100% on room air reassured discharged home to follow-up with primary care return if any worsening or new symptoms    Amount and/or Complexity of Data Reviewed  Labs: ordered. Radiology: ordered. ECG/medicine tests: ordered. Details: EKG: normal EKG, normal sinus rhythm. Coding   FINAL IMPRESSION      1.  Anxiety          DISPOSITION/PLAN   DISPOSITION Decision To Discharge 05/13/2023 07:19:56 PM      PATIENT REFERRED TO:  Carissa Vidal, [address] [place] [address]  [phone]    In 3 days        DISCHARGE MEDICATIONS:  Discharge Medication List as of 5/13/2023  7:22 PM             (Please note thatportions of this note were completed with a voice recognition

## 2023-05-14 ASSESSMENT — ENCOUNTER SYMPTOMS
EYES NEGATIVE: 1
SHORTNESS OF BREATH: 1
ALLERGIC/IMMUNOLOGIC NEGATIVE: 1
GASTROINTESTINAL NEGATIVE: 1

## 2023-05-19 LAB
EKG ATRIAL RATE: 89 BPM
EKG P AXIS: 47 DEGREES
EKG P-R INTERVAL: 140 MS
EKG Q-T INTERVAL: 370 MS
EKG QRS DURATION: 82 MS
EKG QTC CALCULATION (BAZETT): 450 MS
EKG R AXIS: 36 DEGREES
EKG T AXIS: 16 DEGREES
EKG VENTRICULAR RATE: 89 BPM

## 2023-06-26 ENCOUNTER — TELEMEDICINE (OUTPATIENT)
Dept: FAMILY MEDICINE CLINIC | Age: 23
End: 2023-06-26
Payer: COMMERCIAL

## 2023-06-26 DIAGNOSIS — K58.9 IRRITABLE BOWEL SYNDROME, UNSPECIFIED TYPE: ICD-10-CM

## 2023-06-26 DIAGNOSIS — F41.9 ANXIETY: ICD-10-CM

## 2023-06-26 DIAGNOSIS — F43.22 ADJUSTMENT DISORDER WITH ANXIOUS MOOD: ICD-10-CM

## 2023-06-26 DIAGNOSIS — K21.9 GASTROESOPHAGEAL REFLUX DISEASE WITHOUT ESOPHAGITIS: ICD-10-CM

## 2023-06-26 DIAGNOSIS — R06.02 SHORTNESS OF BREATH: ICD-10-CM

## 2023-06-26 DIAGNOSIS — J44.9 OBSTRUCTIVE AIRWAY DISEASE (HCC): ICD-10-CM

## 2023-06-26 DIAGNOSIS — G43.919 INTRACTABLE MIGRAINE WITHOUT STATUS MIGRAINOSUS, UNSPECIFIED MIGRAINE TYPE: ICD-10-CM

## 2023-06-26 PROCEDURE — 99214 OFFICE O/P EST MOD 30 MIN: CPT | Performed by: FAMILY MEDICINE

## 2023-06-26 PROCEDURE — 3023F SPIROM DOC REV: CPT | Performed by: FAMILY MEDICINE

## 2023-06-26 PROCEDURE — G8417 CALC BMI ABV UP PARAM F/U: HCPCS | Performed by: FAMILY MEDICINE

## 2023-06-26 PROCEDURE — 1036F TOBACCO NON-USER: CPT | Performed by: FAMILY MEDICINE

## 2023-06-26 PROCEDURE — G8427 DOCREV CUR MEDS BY ELIG CLIN: HCPCS | Performed by: FAMILY MEDICINE

## 2023-06-26 RX ORDER — NAPROXEN 500 MG/1
500 TABLET ORAL 2 TIMES DAILY PRN
Qty: 60 TABLET | Refills: 0 | Status: SHIPPED | OUTPATIENT
Start: 2023-06-26

## 2023-06-26 RX ORDER — FLUTICASONE PROPIONATE AND SALMETEROL XINAFOATE 115; 21 UG/1; UG/1
2 AEROSOL, METERED RESPIRATORY (INHALATION) 2 TIMES DAILY
Qty: 1 EACH | Refills: 3 | Status: SHIPPED | OUTPATIENT
Start: 2023-06-26

## 2023-06-26 RX ORDER — FLUOXETINE 10 MG/1
10 CAPSULE ORAL DAILY
Qty: 30 CAPSULE | Refills: 3 | Status: SHIPPED | OUTPATIENT
Start: 2023-06-26

## 2023-06-26 RX ORDER — DICYCLOMINE HCL 20 MG
20 TABLET ORAL 3 TIMES DAILY PRN
Qty: 90 TABLET | Refills: 2 | Status: SHIPPED | OUTPATIENT
Start: 2023-06-26

## 2023-06-26 RX ORDER — SUMATRIPTAN 25 MG/1
25 TABLET, FILM COATED ORAL DAILY PRN
Qty: 9 TABLET | Refills: 3 | Status: SHIPPED | OUTPATIENT
Start: 2023-06-26

## 2023-06-26 RX ORDER — ALBUTEROL SULFATE 90 UG/1
2 AEROSOL, METERED RESPIRATORY (INHALATION) EVERY 6 HOURS PRN
Qty: 18 G | Refills: 3 | Status: SHIPPED | OUTPATIENT
Start: 2023-06-26

## 2023-06-26 RX ORDER — FAMOTIDINE 40 MG/1
20 TABLET, FILM COATED ORAL 2 TIMES DAILY PRN
Qty: 30 TABLET | Refills: 3 | Status: SHIPPED | OUTPATIENT
Start: 2023-06-26

## 2023-06-29 ENCOUNTER — TELEPHONE (OUTPATIENT)
Dept: FAMILY MEDICINE CLINIC | Age: 23
End: 2023-06-29

## 2023-07-11 ENCOUNTER — OFFICE VISIT (OUTPATIENT)
Dept: FAMILY MEDICINE CLINIC | Age: 23
End: 2023-07-11
Payer: COMMERCIAL

## 2023-07-11 VITALS
SYSTOLIC BLOOD PRESSURE: 108 MMHG | BODY MASS INDEX: 31.5 KG/M2 | WEIGHT: 220 LBS | RESPIRATION RATE: 12 BRPM | DIASTOLIC BLOOD PRESSURE: 78 MMHG | OXYGEN SATURATION: 97 % | HEART RATE: 112 BPM | HEIGHT: 70 IN | TEMPERATURE: 97.5 F

## 2023-07-11 DIAGNOSIS — J30.2 SEASONAL ALLERGIES: ICD-10-CM

## 2023-07-11 DIAGNOSIS — R10.30 LOWER ABDOMINAL PAIN: ICD-10-CM

## 2023-07-11 DIAGNOSIS — K52.9 CHRONIC DIARRHEA: Primary | ICD-10-CM

## 2023-07-11 DIAGNOSIS — J45.20 MILD INTERMITTENT ASTHMA WITHOUT COMPLICATION: ICD-10-CM

## 2023-07-11 LAB
BASOPHILS # BLD: 0.1 K/UL (ref 0–0.2)
BASOPHILS NFR BLD: 0.9 %
EOSINOPHIL # BLD: 0.5 K/UL (ref 0–0.7)
EOSINOPHIL NFR BLD: 6.9 %
ERYTHROCYTE [DISTWIDTH] IN BLOOD BY AUTOMATED COUNT: 13.5 % (ref 11.5–14.5)
HCT VFR BLD AUTO: 44.6 % (ref 42–52)
HGB BLD-MCNC: 14.7 G/DL (ref 14–18)
LYMPHOCYTES # BLD: 1.4 K/UL (ref 1–4.8)
LYMPHOCYTES NFR BLD: 21.6 %
MCH RBC QN AUTO: 29 PG (ref 27–31.3)
MCHC RBC AUTO-ENTMCNC: 32.9 % (ref 33–37)
MCV RBC AUTO: 88.2 FL (ref 79–92.2)
MONOCYTES # BLD: 0.5 K/UL (ref 0.2–0.8)
MONOCYTES NFR BLD: 7.5 %
NEUTROPHILS # BLD: 4.2 K/UL (ref 1.4–6.5)
NEUTS SEG NFR BLD: 63.1 %
PLATELET # BLD AUTO: 317 K/UL (ref 130–400)
RBC # BLD AUTO: 5.05 M/UL (ref 4.7–6.1)
TSH REFLEX: 0.55 UIU/ML (ref 0.44–3.86)
WBC # BLD AUTO: 6.6 K/UL (ref 4.8–10.8)

## 2023-07-11 PROCEDURE — G8427 DOCREV CUR MEDS BY ELIG CLIN: HCPCS | Performed by: NURSE PRACTITIONER

## 2023-07-11 PROCEDURE — 99214 OFFICE O/P EST MOD 30 MIN: CPT | Performed by: NURSE PRACTITIONER

## 2023-07-11 PROCEDURE — 1036F TOBACCO NON-USER: CPT | Performed by: NURSE PRACTITIONER

## 2023-07-11 PROCEDURE — G8417 CALC BMI ABV UP PARAM F/U: HCPCS | Performed by: NURSE PRACTITIONER

## 2023-07-11 ASSESSMENT — ENCOUNTER SYMPTOMS
STRIDOR: 0
VOMITING: 0
SHORTNESS OF BREATH: 1
BLOOD IN STOOL: 0
ABDOMINAL PAIN: 1
CONSTIPATION: 0
DIARRHEA: 1
NAUSEA: 0
ANAL BLEEDING: 0
RECTAL PAIN: 0
COUGH: 0
CHEST TIGHTNESS: 0
WHEEZING: 0
ABDOMINAL DISTENTION: 0

## 2023-07-11 NOTE — PROGRESS NOTES
Subjective:      Patient ID: Fang Arreguin is a 26 y.o. male who presents today for:  Chief Complaint   Patient presents with    Abdominal Pain     Patient present today with abdominal pain for the last 6 months but has gotten worse within the last week or so. The pain resinate along his belly button across both sides of his stomach. He rates it a 7-8 on the pain scale. He was prescribed bentyl and it takes the edge away. HPI  Patient is here for c/o lower abdominal pain for the last 6 months. Says it is worse lately. Says he has diarrhea as well. No constipation. Says it is watery at times. Comes and goes. Says he has no fever or chills recently. Says he is able to eat and drink normally. Denies any obvious blood or mucus in the stool. He has seen PCP for this. He has taken Bentyl that helps with the pain and diarrhea. Says he thinks he has irritable bowel. Patient is also asking for allergy referral.   He has HX asthma. Having some increased SOB. Denies cough or wheezing. Has not been ill. Taking Advair. Will use the albuterol.     Past Medical History:   Diagnosis Date    ADHD     Anxiety     Asthma     Depression     Obstructive airway disease ([address]) 01/2022    PFT good response with brochodilator    Oppositional defiant disorder      Past Surgical History:   Procedure Laterality Date    ADENOIDECTOMY      TONSILLECTOMY       Social History     Socioeconomic History    Marital status: Single     Spouse name: Not on file    Number of children: Not on file    Years of education: Not on file    Highest education level: Not on file   Occupational History    Occupation: high school   Tobacco Use    Smoking status: Never    Smokeless tobacco: Never   Vaping Use    Vaping Use: Never used   Substance and Sexual Activity    Alcohol use: Never    Drug use: Never    Sexual activity: Not on file   Other Topics Concern    Not on file   Social History Narrative    Not on file     Social

## 2023-08-01 ENCOUNTER — TELEPHONE (OUTPATIENT)
Dept: GASTROENTEROLOGY | Age: 23
End: 2023-08-01

## 2023-08-14 ENCOUNTER — PREP FOR PROCEDURE (OUTPATIENT)
Dept: GASTROENTEROLOGY | Age: 23
End: 2023-08-14

## 2023-08-14 ENCOUNTER — OFFICE VISIT (OUTPATIENT)
Dept: GASTROENTEROLOGY | Age: 23
End: 2023-08-14
Payer: COMMERCIAL

## 2023-08-14 VITALS
BODY MASS INDEX: 33.86 KG/M2 | WEIGHT: 236 LBS | DIASTOLIC BLOOD PRESSURE: 80 MMHG | OXYGEN SATURATION: 96 % | SYSTOLIC BLOOD PRESSURE: 110 MMHG | HEART RATE: 80 BPM

## 2023-08-14 DIAGNOSIS — K59.00 CONSTIPATION, UNSPECIFIED CONSTIPATION TYPE: Primary | ICD-10-CM

## 2023-08-14 PROCEDURE — G8417 CALC BMI ABV UP PARAM F/U: HCPCS | Performed by: NURSE PRACTITIONER

## 2023-08-14 PROCEDURE — 99204 OFFICE O/P NEW MOD 45 MIN: CPT | Performed by: NURSE PRACTITIONER

## 2023-08-14 PROCEDURE — G8427 DOCREV CUR MEDS BY ELIG CLIN: HCPCS | Performed by: NURSE PRACTITIONER

## 2023-08-14 PROCEDURE — 1036F TOBACCO NON-USER: CPT | Performed by: NURSE PRACTITIONER

## 2023-08-14 RX ORDER — POLYETHYLENE GLYCOL 3350 17 G/17G
238 POWDER, FOR SOLUTION ORAL ONCE
Qty: 238 G | Refills: 0 | Status: SHIPPED | OUTPATIENT
Start: 2023-08-14 | End: 2023-08-14

## 2023-08-14 ASSESSMENT — ENCOUNTER SYMPTOMS
DIARRHEA: 0
COLOR CHANGE: 0
TROUBLE SWALLOWING: 0
EYE REDNESS: 0
CONSTIPATION: 1
BLOOD IN STOOL: 0
ABDOMINAL DISTENTION: 0
ABDOMINAL PAIN: 1
NAUSEA: 0
EYE PAIN: 0
VOMITING: 0
PHOTOPHOBIA: 0
CHEST TIGHTNESS: 0
RECTAL PAIN: 0
VOICE CHANGE: 0
ANAL BLEEDING: 0

## 2023-08-14 NOTE — PROGRESS NOTES
Subjective:      Patient ID: Fang Arreguin is a 26 y.o. male who presents today for:  Chief Complaint   Patient presents with    Abdominal Pain    Irritable Bowel Syndrome       HPI  Patient came in to establish GI care with complaints of generalized lower abdominal pain associated with constipation, was seen in a walk-in clinic for similar complaints and prescribed dicyclomine, notes some improvement in symptoms. Constipation started somewhere between 6 months to 1 year ago and has progressively gotten worse over the last several months. No Rectal bleeding or melena ,Nocturnal pain or progressive abdominal pain. Denies unexplained weight loss, fever, or other systemic symptoms. No First-degree relative with  colorectal cancer. Not on anticoagulants or antiaggregant therapy.      Past Medical History:   Diagnosis Date    ADHD     Anxiety     Asthma     Depression     Obstructive airway disease ([address]) 01/2022    PFT good response with brochodilator    Oppositional defiant disorder      Past Surgical History:   Procedure Laterality Date    ADENOIDECTOMY      TONSILLECTOMY       Social History     Socioeconomic History    Marital status: Single     Spouse name: Not on file    Number of children: Not on file    Years of education: Not on file    Highest education level: Not on file   Occupational History    Occupation: high school   Tobacco Use    Smoking status: Never    Smokeless tobacco: Never   Vaping Use    Vaping Use: Never used   Substance and Sexual Activity    Alcohol use: Never    Drug use: Never    Sexual activity: Not on file   Other Topics Concern    Not on file   Social History Narrative    Not on file     Social Determinants of Health     Financial Resource Strain: Low Risk     Difficulty of Paying Living Expenses: Not hard at all   Food Insecurity: No Food Insecurity    Worried About Running Out of Food in the Last Year: Never true    [address] in the Last Year: Never true   Transportation

## 2023-09-01 ENCOUNTER — ANESTHESIA EVENT (OUTPATIENT)
Dept: ENDOSCOPY | Age: 23
End: 2023-09-01
Payer: COMMERCIAL

## 2023-09-01 ENCOUNTER — HOSPITAL ENCOUNTER (OUTPATIENT)
Age: 23
Setting detail: OUTPATIENT SURGERY
Discharge: HOME | End: 2023-09-01
Attending: INTERNAL MEDICINE | Admitting: INTERNAL MEDICINE
Payer: COMMERCIAL

## 2023-09-01 ENCOUNTER — ANESTHESIA (OUTPATIENT)
Dept: ENDOSCOPY | Age: 23
End: 2023-09-01
Payer: COMMERCIAL

## 2023-09-01 VITALS
HEART RATE: 86 BPM | HEIGHT: 70 IN | OXYGEN SATURATION: 98 % | WEIGHT: 236 LBS | DIASTOLIC BLOOD PRESSURE: 62 MMHG | RESPIRATION RATE: 16 BRPM | SYSTOLIC BLOOD PRESSURE: 111 MMHG | BODY MASS INDEX: 33.79 KG/M2 | TEMPERATURE: 98.2 F

## 2023-09-01 DIAGNOSIS — K59.00 CONSTIPATION: ICD-10-CM

## 2023-09-01 PROCEDURE — 7100000011 HC PHASE II RECOVERY - ADDTL 15 MIN: Performed by: INTERNAL MEDICINE

## 2023-09-01 PROCEDURE — 6370000000 HC RX 637 (ALT 250 FOR IP): Performed by: INTERNAL MEDICINE

## 2023-09-01 PROCEDURE — 2500000003 HC RX 250 WO HCPCS: Performed by: NURSE ANESTHETIST, CERTIFIED REGISTERED

## 2023-09-01 PROCEDURE — 7100000010 HC PHASE II RECOVERY - FIRST 15 MIN: Performed by: INTERNAL MEDICINE

## 2023-09-01 PROCEDURE — 45380 COLONOSCOPY AND BIOPSY: CPT | Performed by: INTERNAL MEDICINE

## 2023-09-01 PROCEDURE — 2580000003 HC RX 258: Performed by: INTERNAL MEDICINE

## 2023-09-01 PROCEDURE — 6360000002 HC RX W HCPCS: Performed by: NURSE ANESTHETIST, CERTIFIED REGISTERED

## 2023-09-01 PROCEDURE — 3700000000 HC ANESTHESIA ATTENDED CARE: Performed by: INTERNAL MEDICINE

## 2023-09-01 PROCEDURE — 3700000001 HC ADD 15 MINUTES (ANESTHESIA): Performed by: INTERNAL MEDICINE

## 2023-09-01 PROCEDURE — 88305 TISSUE EXAM BY PATHOLOGIST: CPT

## 2023-09-01 PROCEDURE — 3609027000 HC COLONOSCOPY: Performed by: INTERNAL MEDICINE

## 2023-09-01 PROCEDURE — 2709999900 HC NON-CHARGEABLE SUPPLY: Performed by: INTERNAL MEDICINE

## 2023-09-01 RX ORDER — SODIUM CHLORIDE 9 MG/ML
INJECTION, SOLUTION INTRAVENOUS PRN
Status: CANCELLED | OUTPATIENT
Start: 2023-09-01

## 2023-09-01 RX ORDER — LIDOCAINE HYDROCHLORIDE 20 MG/ML
INJECTION, SOLUTION INFILTRATION; PERINEURAL PRN
Status: DISCONTINUED | OUTPATIENT
Start: 2023-09-01 | End: 2023-09-01 | Stop reason: SDUPTHER

## 2023-09-01 RX ORDER — SODIUM CHLORIDE 0.9 % (FLUSH) 0.9 %
5-40 SYRINGE (ML) INJECTION EVERY 12 HOURS SCHEDULED
Status: DISCONTINUED | OUTPATIENT
Start: 2023-09-01 | End: 2023-09-01 | Stop reason: HOSPADM

## 2023-09-01 RX ORDER — SODIUM CHLORIDE 9 MG/ML
INJECTION, SOLUTION INTRAVENOUS CONTINUOUS
Status: DISCONTINUED | OUTPATIENT
Start: 2023-09-01 | End: 2023-09-01 | Stop reason: HOSPADM

## 2023-09-01 RX ORDER — PROPOFOL 10 MG/ML
INJECTION, EMULSION INTRAVENOUS PRN
Status: DISCONTINUED | OUTPATIENT
Start: 2023-09-01 | End: 2023-09-01 | Stop reason: SDUPTHER

## 2023-09-01 RX ORDER — SODIUM CHLORIDE 9 MG/ML
INJECTION, SOLUTION INTRAVENOUS CONTINUOUS
Status: CANCELLED | OUTPATIENT
Start: 2023-09-01

## 2023-09-01 RX ORDER — MAGNESIUM HYDROXIDE 1200 MG/15ML
LIQUID ORAL PRN
Status: DISCONTINUED | OUTPATIENT
Start: 2023-09-01 | End: 2023-09-01 | Stop reason: ALTCHOICE

## 2023-09-01 RX ORDER — SODIUM CHLORIDE 9 MG/ML
INJECTION, SOLUTION INTRAVENOUS PRN
Status: DISCONTINUED | OUTPATIENT
Start: 2023-09-01 | End: 2023-09-01 | Stop reason: HOSPADM

## 2023-09-01 RX ORDER — SODIUM CHLORIDE 9 MG/ML
INJECTION, SOLUTION INTRAVENOUS
Status: DISCONTINUED
Start: 2023-09-01 | End: 2023-09-01 | Stop reason: HOSPADM

## 2023-09-01 RX ORDER — SIMETHICONE 20 MG/.3ML
EMULSION ORAL PRN
Status: DISCONTINUED | OUTPATIENT
Start: 2023-09-01 | End: 2023-09-01 | Stop reason: ALTCHOICE

## 2023-09-01 RX ORDER — SODIUM CHLORIDE 0.9 % (FLUSH) 0.9 %
5-40 SYRINGE (ML) INJECTION EVERY 12 HOURS SCHEDULED
Status: CANCELLED | OUTPATIENT
Start: 2023-09-01

## 2023-09-01 RX ADMIN — PROPOFOL 50 MG: 10 INJECTION, EMULSION INTRAVENOUS at 12:18

## 2023-09-01 RX ADMIN — PROPOFOL 50 MG: 10 INJECTION, EMULSION INTRAVENOUS at 12:21

## 2023-09-01 RX ADMIN — SODIUM CHLORIDE: 9 INJECTION, SOLUTION INTRAVENOUS at 12:00

## 2023-09-01 RX ADMIN — LIDOCAINE HYDROCHLORIDE 50 MG: 20 INJECTION, SOLUTION INFILTRATION; PERINEURAL at 12:10

## 2023-09-01 RX ADMIN — PROPOFOL 150 MG: 10 INJECTION, EMULSION INTRAVENOUS at 12:10

## 2023-09-01 RX ADMIN — PROPOFOL 50 MG: 10 INJECTION, EMULSION INTRAVENOUS at 12:12

## 2023-09-01 RX ADMIN — PROPOFOL 50 MG: 10 INJECTION, EMULSION INTRAVENOUS at 12:14

## 2023-09-01 RX ADMIN — PROPOFOL 50 MG: 10 INJECTION, EMULSION INTRAVENOUS at 12:16

## 2023-09-01 ASSESSMENT — PAIN SCALES - GENERAL
PAINLEVEL_OUTOF10: 0
PAINLEVEL_OUTOF10: 0

## 2023-09-01 ASSESSMENT — PAIN - FUNCTIONAL ASSESSMENT: PAIN_FUNCTIONAL_ASSESSMENT: 0-10

## 2023-09-01 NOTE — ANESTHESIA POSTPROCEDURE EVALUATION
Department of Anesthesiology  Postprocedure Note    Patient: Honey Boast  MRN: [id number]  YOB: 2000  Date of evaluation: 9/1/2023      Procedure Summary     Date: 09/01/23 Room / Location: [address] [place] [address] Belénssa Cooks    Anesthesia Start: 4388 Anesthesia Stop: 3169    Procedure: COLONOSCOPY DIAGNOSTIC Diagnosis:       Constipation      (Constipation [K59.00])    Surgeons: Aruna Truong MD Responsible Provider: MANUELA Peña CRNA    Anesthesia Type: MAC ASA Status: 2          Anesthesia Type: No value filed.     Aida Phase I: Aida Score: 10    Aida Phase II:        Anesthesia Post Evaluation    Patient location during evaluation: PACU  Level of consciousness: awake  Pain score: 0  Airway patency: patent  Nausea & Vomiting: no vomiting and no nausea  Complications: no  Cardiovascular status: hemodynamically stable  Respiratory status: acceptable  Hydration status: stable  Pain management: adequate and satisfactory to patient

## 2023-09-28 ENCOUNTER — OFFICE VISIT (OUTPATIENT)
Dept: GASTROENTEROLOGY | Age: 23
End: 2023-09-28
Payer: COMMERCIAL

## 2023-09-28 VITALS
BODY MASS INDEX: 33.29 KG/M2 | WEIGHT: 232 LBS | SYSTOLIC BLOOD PRESSURE: 116 MMHG | HEART RATE: 112 BPM | DIASTOLIC BLOOD PRESSURE: 74 MMHG | OXYGEN SATURATION: 98 %

## 2023-09-28 DIAGNOSIS — D12.5 ADENOMATOUS POLYP OF SIGMOID COLON: ICD-10-CM

## 2023-09-28 DIAGNOSIS — K59.00 CONSTIPATION, UNSPECIFIED CONSTIPATION TYPE: Primary | ICD-10-CM

## 2023-09-28 PROCEDURE — 99213 OFFICE O/P EST LOW 20 MIN: CPT | Performed by: NURSE PRACTITIONER

## 2023-09-28 PROCEDURE — G8417 CALC BMI ABV UP PARAM F/U: HCPCS | Performed by: NURSE PRACTITIONER

## 2023-09-28 PROCEDURE — 1036F TOBACCO NON-USER: CPT | Performed by: NURSE PRACTITIONER

## 2023-09-28 PROCEDURE — G8427 DOCREV CUR MEDS BY ELIG CLIN: HCPCS | Performed by: NURSE PRACTITIONER

## 2023-09-28 ASSESSMENT — ENCOUNTER SYMPTOMS
PHOTOPHOBIA: 0
ABDOMINAL PAIN: 0
VOICE CHANGE: 0
EYE PAIN: 0
CHEST TIGHTNESS: 0
EYE REDNESS: 0
RECTAL PAIN: 0
NAUSEA: 0
BLOOD IN STOOL: 0
TROUBLE SWALLOWING: 0
DIARRHEA: 0
CONSTIPATION: 1
ANAL BLEEDING: 0
COLOR CHANGE: 0
VOMITING: 0
ABDOMINAL DISTENTION: 0

## 2023-09-28 NOTE — PROGRESS NOTES
cold           forceps for histology. -  The exam was otherwise normal throughout the examined colon. Impression:        -  One diminutive polyp in the cecum. Biopsied.        -  The exam was otherwise normal to the cecum. Recommendation:        -  Repeat colonoscopy for surveillance based on pathology results. -  Return to GI clinic as previously scheduled. -  The patient will be observed post-procedure, until all discharge criteria           are met. -  Patient has a contact number available for emergencies. The signs and           symptoms of potential delayed complications were discussed with the patient. Return to normal activities tomorrow. Written discharge instructions were           provided to the patient. Procedure Code(s):        - [id number], Colonoscopy, flexible; with biopsy, single or multiple Diagnosis Code(s):        - D12.0, Benign neoplasm of cecum       CPT(R) - 2022 copyright [employer]. All Rights Reserved. The CPT codes, CCI edits and ICD codes generated are intended as suggestions       and were generated based on input data. These codes are preliminary and upon        review may be revised to meet current compliance and payer requirements. The provider is responsible for the final determination of appropriate codes,       and modifiers. Scope Withdrawal Time:       00:14:16 Kane Vizcaino MD This document has been electronically signed. Note Initiated:9/1/2023 Note Completed:9/1/2023 12:34 PM    No results found for: \"IRON\", \"TIBC\", \"FERRITIN\"  No results found for: \"INR\"  No components found for: \"ACUTEHEPATITISSCREEN\"  No components found for: \"CELIACPANEL\"  No components found for: \"STOOLCULTURE\", \"C. DIFF\", \"STOOLOVAPARASITE\", \"STOOLLEUCOCYTE\"    Endoscopic hx:  Colonoscopy Dr Celestino Kaur   Impression:         -  One diminutive polyp in the cecum.   Biopsied.         -  The exam was otherwise normal to the

## 2023-11-06 ENCOUNTER — HOSPITAL ENCOUNTER (EMERGENCY)
Facility: HOSPITAL | Age: 23
Discharge: HOME | End: 2023-11-07
Payer: COMMERCIAL

## 2023-11-06 ENCOUNTER — APPOINTMENT (OUTPATIENT)
Dept: RADIOLOGY | Facility: HOSPITAL | Age: 23
End: 2023-11-06
Payer: COMMERCIAL

## 2023-11-06 DIAGNOSIS — R06.02 SHORTNESS OF BREATH: Primary | ICD-10-CM

## 2023-11-06 DIAGNOSIS — U07.1 COVID: ICD-10-CM

## 2023-11-06 LAB
APTT PPP: 32 SECONDS (ref 27–38)
BASOPHILS # BLD AUTO: 0.05 X10*3/UL (ref 0–0.1)
BASOPHILS NFR BLD AUTO: 0.7 %
D DIMER PPP FEU-MCNC: <215 NG/ML FEU
EOSINOPHIL # BLD AUTO: 0.34 X10*3/UL (ref 0–0.7)
EOSINOPHIL NFR BLD AUTO: 4.9 %
ERYTHROCYTE [DISTWIDTH] IN BLOOD BY AUTOMATED COUNT: 12 % (ref 11.5–14.5)
HCT VFR BLD AUTO: 43.7 % (ref 41–52)
HGB BLD-MCNC: 14.7 G/DL (ref 13.5–17.5)
IMM GRANULOCYTES # BLD AUTO: 0.02 X10*3/UL (ref 0–0.7)
IMM GRANULOCYTES NFR BLD AUTO: 0.3 % (ref 0–0.9)
INR PPP: 1 (ref 0.9–1.1)
LYMPHOCYTES # BLD AUTO: 1 X10*3/UL (ref 1.2–4.8)
LYMPHOCYTES NFR BLD AUTO: 14.3 %
MCH RBC QN AUTO: 29.5 PG (ref 26–34)
MCHC RBC AUTO-ENTMCNC: 33.6 G/DL (ref 32–36)
MCV RBC AUTO: 88 FL (ref 80–100)
MONOCYTES # BLD AUTO: 0.96 X10*3/UL (ref 0.1–1)
MONOCYTES NFR BLD AUTO: 13.7 %
NEUTROPHILS # BLD AUTO: 4.63 X10*3/UL (ref 1.2–7.7)
NEUTROPHILS NFR BLD AUTO: 66.1 %
NRBC BLD-RTO: 0 /100 WBCS (ref 0–0)
PLATELET # BLD AUTO: 274 X10*3/UL (ref 150–450)
PROTHROMBIN TIME: 11.8 SECONDS (ref 9.8–12.8)
RBC # BLD AUTO: 4.99 X10*6/UL (ref 4.5–5.9)
WBC # BLD AUTO: 7 X10*3/UL (ref 4.4–11.3)

## 2023-11-06 PROCEDURE — 85730 THROMBOPLASTIN TIME PARTIAL: CPT

## 2023-11-06 PROCEDURE — 99285 EMERGENCY DEPT VISIT HI MDM: CPT | Mod: 25

## 2023-11-06 PROCEDURE — 85025 COMPLETE CBC W/AUTO DIFF WBC: CPT

## 2023-11-06 PROCEDURE — 85379 FIBRIN DEGRADATION QUANT: CPT

## 2023-11-06 PROCEDURE — 71045 X-RAY EXAM CHEST 1 VIEW: CPT | Performed by: RADIOLOGY

## 2023-11-06 PROCEDURE — 71045 X-RAY EXAM CHEST 1 VIEW: CPT

## 2023-11-06 PROCEDURE — 36415 COLL VENOUS BLD VENIPUNCTURE: CPT

## 2023-11-06 PROCEDURE — 96361 HYDRATE IV INFUSION ADD-ON: CPT

## 2023-11-06 PROCEDURE — 2500000004 HC RX 250 GENERAL PHARMACY W/ HCPCS (ALT 636 FOR OP/ED)

## 2023-11-06 PROCEDURE — 96374 THER/PROPH/DIAG INJ IV PUSH: CPT

## 2023-11-06 PROCEDURE — 99284 EMERGENCY DEPT VISIT MOD MDM: CPT | Mod: 25

## 2023-11-06 PROCEDURE — 99283 EMERGENCY DEPT VISIT LOW MDM: CPT | Mod: 25

## 2023-11-06 RX ORDER — KETOROLAC TROMETHAMINE 30 MG/ML
30 INJECTION, SOLUTION INTRAMUSCULAR; INTRAVENOUS ONCE
Status: COMPLETED | OUTPATIENT
Start: 2023-11-06 | End: 2023-11-06

## 2023-11-06 RX ADMIN — KETOROLAC TROMETHAMINE 30 MG: 30 INJECTION, SOLUTION INTRAMUSCULAR at 23:50

## 2023-11-06 ASSESSMENT — LIFESTYLE VARIABLES
HAVE PEOPLE ANNOYED YOU BY CRITICIZING YOUR DRINKING: NO
EVER FELT BAD OR GUILTY ABOUT YOUR DRINKING: NO
REASON UNABLE TO ASSESS: NO
HAVE YOU EVER FELT YOU SHOULD CUT DOWN ON YOUR DRINKING: NO
EVER HAD A DRINK FIRST THING IN THE MORNING TO STEADY YOUR NERVES TO GET RID OF A HANGOVER: NO

## 2023-11-06 ASSESSMENT — COLUMBIA-SUICIDE SEVERITY RATING SCALE - C-SSRS
2. HAVE YOU ACTUALLY HAD ANY THOUGHTS OF KILLING YOURSELF?: NO
6. HAVE YOU EVER DONE ANYTHING, STARTED TO DO ANYTHING, OR PREPARED TO DO ANYTHING TO END YOUR LIFE?: NO
1. IN THE PAST MONTH, HAVE YOU WISHED YOU WERE DEAD OR WISHED YOU COULD GO TO SLEEP AND NOT WAKE UP?: NO

## 2023-11-07 ENCOUNTER — HOSPITAL ENCOUNTER (EMERGENCY)
Dept: CARDIOLOGY | Facility: HOSPITAL | Age: 23
Discharge: HOME | End: 2023-11-07
Payer: COMMERCIAL

## 2023-11-07 VITALS
TEMPERATURE: 98.6 F | OXYGEN SATURATION: 100 % | HEIGHT: 71 IN | RESPIRATION RATE: 20 BRPM | WEIGHT: 223 LBS | BODY MASS INDEX: 31.22 KG/M2 | HEART RATE: 100 BPM | DIASTOLIC BLOOD PRESSURE: 66 MMHG | SYSTOLIC BLOOD PRESSURE: 120 MMHG

## 2023-11-07 LAB
ALBUMIN SERPL BCP-MCNC: 4.6 G/DL (ref 3.4–5)
ALP SERPL-CCNC: 96 U/L (ref 33–120)
ALT SERPL W P-5'-P-CCNC: 23 U/L (ref 10–52)
ANION GAP SERPL CALC-SCNC: 11 MMOL/L (ref 10–20)
AST SERPL W P-5'-P-CCNC: 16 U/L (ref 9–39)
BILIRUB SERPL-MCNC: 0.5 MG/DL (ref 0–1.2)
BUN SERPL-MCNC: 12 MG/DL (ref 6–23)
CALCIUM SERPL-MCNC: 9.4 MG/DL (ref 8.6–10.3)
CHLORIDE SERPL-SCNC: 103 MMOL/L (ref 98–107)
CO2 SERPL-SCNC: 27 MMOL/L (ref 21–32)
CREAT SERPL-MCNC: 1 MG/DL (ref 0.5–1.3)
GFR SERPL CREATININE-BSD FRML MDRD: >90 ML/MIN/1.73M*2
GLUCOSE SERPL-MCNC: 94 MG/DL (ref 74–99)
POTASSIUM SERPL-SCNC: 3.9 MMOL/L (ref 3.5–5.3)
PROT SERPL-MCNC: 7.7 G/DL (ref 6.4–8.2)
SODIUM SERPL-SCNC: 137 MMOL/L (ref 136–145)

## 2023-11-07 PROCEDURE — 2500000004 HC RX 250 GENERAL PHARMACY W/ HCPCS (ALT 636 FOR OP/ED)

## 2023-11-07 PROCEDURE — 36415 COLL VENOUS BLD VENIPUNCTURE: CPT

## 2023-11-07 PROCEDURE — 93005 ELECTROCARDIOGRAM TRACING: CPT

## 2023-11-07 PROCEDURE — 80053 COMPREHEN METABOLIC PANEL: CPT

## 2023-11-07 RX ORDER — PREDNISONE 20 MG/1
20 TABLET ORAL 2 TIMES DAILY
Qty: 6 TABLET | Refills: 0 | Status: SHIPPED | OUTPATIENT
Start: 2023-11-07 | End: 2023-11-10

## 2023-11-07 RX ORDER — ALBUTEROL SULFATE 90 UG/1
1-2 AEROSOL, METERED RESPIRATORY (INHALATION) EVERY 6 HOURS PRN
Qty: 18 G | Refills: 0 | Status: SHIPPED | OUTPATIENT
Start: 2023-11-07 | End: 2023-12-07

## 2023-11-07 RX ORDER — NAPROXEN SODIUM 550 MG/1
550 TABLET ORAL
Qty: 14 TABLET | Refills: 0 | Status: SHIPPED | OUTPATIENT
Start: 2023-11-07 | End: 2023-11-14

## 2023-11-07 RX ORDER — GUAIFENESIN 600 MG/1
1200 TABLET, EXTENDED RELEASE ORAL 2 TIMES DAILY
Qty: 28 TABLET | Refills: 0 | Status: SHIPPED | OUTPATIENT
Start: 2023-11-07 | End: 2023-11-14

## 2023-11-07 RX ADMIN — SODIUM CHLORIDE, POTASSIUM CHLORIDE, SODIUM LACTATE AND CALCIUM CHLORIDE 1000 ML: 600; 310; 30; 20 INJECTION, SOLUTION INTRAVENOUS at 00:04

## 2023-11-07 ASSESSMENT — PAIN DESCRIPTION - PROGRESSION: CLINICAL_PROGRESSION: NOT CHANGED

## 2023-11-07 ASSESSMENT — PAIN - FUNCTIONAL ASSESSMENT: PAIN_FUNCTIONAL_ASSESSMENT: 0-10

## 2023-11-07 ASSESSMENT — PAIN SCALES - GENERAL: PAINLEVEL_OUTOF10: 5 - MODERATE PAIN

## 2023-11-07 NOTE — ED PROVIDER NOTES
"HPI   Chief Complaint   Patient presents with    Shortness of Breath     Tested positive for covid at home.         History provided by: Patient    Limitations to history:  none    CC: shortness of breath, chest pain, cough    HPI: 23-year-old male presents emergency department to be evaluated for shortness of breath, chest pain, cough.  He says that his symptoms started a few days ago with a shortness of breath and cough.  He says that the cough was originally dry but then he had a few episodes where he was coughing up \"brown, may be red \"mucus.  He did a COVID test at home which was positive.  He does have a history of asthma but has not required breathing treatments in several years.  He denies history of heart failure and denies pain or swelling in the extremities.  He denies history of blood clots and denies recent travel or surgeries.  Denies history of cancer.  Denies family history of heart or lung disease.  Denies history of ACS, is never required a stress test.  He does report some mild sharp generalized chest pain that is worse when he takes a big breath.  He denies taking any medications for symptoms prior to arrival.  Denies weakness and fatigue.  Denies fever and chills.  Denies all other systemic symptoms.    ROS: Negative unless mentioned in HPI    Social Hx:  Denies tobacco, alcohol, drug use    Medical Hx:   medical history significant for asthma.  Allergy to shellfish  and peanuts.  Immunizations are up-to-date.    Surgical HX:  denies     physical exam:    Constitutional: Patient is well-nourished and well-developed.  Sitting comfortably in the room and in no distress.  Oriented to person, place, time, and situation.    HEENT: Head is normocephalic, atraumatic. Patient's airway is patent.  Tympanic membranes are clear bilaterally.  Nasal mucosa clear.  Mouth with normal mucosa.  Throat is not erythematous and there are no oropharyngeal exudates, uvula is midline.  No obvious facial " deformities.    Eyes: Clear bilaterally.  Pupils are equal round and reactive to light and accommodation.  Extraocular movements intact.      Cardiac:   Tachycardic, regular rhythm.  Heart sounds S1, S2.  No murmurs, rubs, or gallops.  PMI nondisplaced.  No JVD.    Respiratory: 96% on room air. Regular respiratory rate and effort.  Breath sounds are clear and equal bilaterally, no adventitious lung sounds.  Patient is speaking in full sentences and is in no apparent respiratory distress. No use of accessory muscles.      Gastrointestinal: Abdomen is soft, nondistended, and nontender.  There are no obvious deformities.  No rebound tenderness or guarding.  Bowel sounds are normal active.    Genitourinary: No CVA or flank tenderness.    Musculoskeletal: No reproducible tenderness.   No erythema or edema.  No calf tenderness.  Negative Homans' sign. No obvious skin or bony deformities.  Patient has equal range of motion in all extremities and no strength deficiencies.  No muscle or joint tenderness. No back or neck tenderness.  Capillary refill less than 3 seconds.  Strong peripheral pulses.  No sensory deficits.    Neurological: Patient is alert and oriented.  No focal deficits.  5/5 strength in all extremities.  Cranial nerves II through XII intact. GCS15.     Skin: Skin is normal color for race and is warm, dry, and intact.  No evidence of trauma.  No lesions, rashes, bruising, jaundice, or masses.    Psych: Appropriate mood and affect.  No apparent risk to self or others.    Heme/lymph: No adenopathy, lymphadenopathy, or splenomegaly    Physical exam is otherwise negative unless stated above or in history of present illness.                          No data recorded                Patient History   No past medical history on file.  No past surgical history on file.  No family history on file.  Social History     Tobacco Use    Smoking status: Not on file    Smokeless tobacco: Not on file   Substance Use Topics     Alcohol use: Not on file    Drug use: Not on file       Physical Exam   ED Triage Vitals [11/06/23 2250]   Temp Heart Rate Resp BP   36.2 °C (97.2 °F) (!) 118 18 141/64      SpO2 Temp src Heart Rate Source Patient Position   96 % -- -- --      BP Location FiO2 (%)     -- --       Physical Exam    ED Course & MDM   Diagnoses as of 11/07/23 0052   Shortness of breath   COVID       Patient updated on plan for lab testing, IV insertion, radiology imaging, and medications to be administered while in the ER (if indicated). Patient updated on expected wait times for testing and results. Patient provided my name and told to ask any staff member for questions or concerns if they should arise. Electronic medical record reviewed.     MDM    Upon initial assessment, patient was healthy non-toxic appearing and in no apparent distress.     Patient presented to the emergency department with the chief complaint of shortness of breath with pleuritic chest campaign and potential hemoptysis.   Breath sounds are clear and equal bilaterally, 96% on room air.  No muffled heart sounds or JVD.  No peripheral edema or erythema noted suggest right-sided heart failure or DVT.  Patient has mild tachycardia. On arrival to the emergency department, vital signs were   Significant tachycardia but otherwise unremarkable.      Patient was given IV normal saline as well as IV Toradol for his discomfort.  Work-up initial including basic blood work, EKG and troponin, chest x-ray, coagulation screen, and a D-dimer.      EKG was performed at 1252 and interpreted by me.  Sinus tachycardia 109 bpm.  No ST elevation or depression.  Normal axis.  No prolonged QT.      Patient is feeling better.  Vital signs showed improvement in his heart rate.  No acute distress.  Troponins within normal limits.  D-dimer is negative making PE unlikely.  CBC shows no leukocytosis or anemia.  CMP shows no acute abnormalities including lites or disturbances or deficiencies the  patient's kidney and liver function.  Coagulation screen shows no findings that would just the patient has an underlying coagulopathy disorder.  Chest x-ray reveals no acute cardiopulmonary process including pneumonia.  Patient feels well and prefer to go home.  Patient will be discharged with p.o. Mucinex, p.o. Anaprox, few days of p.o. prednisone, and an albuterol inhaler.  I also given percent spirometry to recommend that he take big breaths in order to prevent pneumonia.  I discussed drinking plenty fluids and resting, contact precautions, quarantine.  They will follow-up with the PCP as needed.  All questions and concerns addressed.  Reasons to return to ER discussed.  Patient verbalized understanding and agreement with the treatment plan and they remained hemodynamically stable in the ER.    This note was dictated using a speech recognition program.  While an attempt was made at proof-reading to minimize errors, minor errors in transcription may be present    Medical Decision Making      Procedure  Procedures     Dejon Huff PA-C  11/07/23 0054

## 2023-12-14 DIAGNOSIS — K58.9 IRRITABLE BOWEL SYNDROME, UNSPECIFIED TYPE: ICD-10-CM

## 2023-12-14 RX ORDER — DICYCLOMINE HYDROCHLORIDE 10 MG/1
CAPSULE ORAL
Qty: 120 CAPSULE | Refills: 3 | OUTPATIENT
Start: 2023-12-14

## 2024-01-29 ASSESSMENT — PATIENT HEALTH QUESTIONNAIRE - PHQ9
2. FEELING DOWN, DEPRESSED OR HOPELESS: 0
SUM OF ALL RESPONSES TO PHQ QUESTIONS 1-9: 0
SUM OF ALL RESPONSES TO PHQ QUESTIONS 1-9: 0
2. FEELING DOWN, DEPRESSED OR HOPELESS: NOT AT ALL
SUM OF ALL RESPONSES TO PHQ9 QUESTIONS 1 & 2: 0
1. LITTLE INTEREST OR PLEASURE IN DOING THINGS: 0
SUM OF ALL RESPONSES TO PHQ QUESTIONS 1-9: 0
1. LITTLE INTEREST OR PLEASURE IN DOING THINGS: NOT AT ALL
SUM OF ALL RESPONSES TO PHQ QUESTIONS 1-9: 0
SUM OF ALL RESPONSES TO PHQ9 QUESTIONS 1 & 2: 0

## 2024-01-30 ENCOUNTER — OFFICE VISIT (OUTPATIENT)
Dept: FAMILY MEDICINE CLINIC | Age: 24
End: 2024-01-30
Payer: COMMERCIAL

## 2024-01-30 VITALS
TEMPERATURE: 97.5 F | BODY MASS INDEX: 32.78 KG/M2 | HEIGHT: 70 IN | OXYGEN SATURATION: 98 % | WEIGHT: 229 LBS | DIASTOLIC BLOOD PRESSURE: 80 MMHG | SYSTOLIC BLOOD PRESSURE: 116 MMHG | HEART RATE: 79 BPM

## 2024-01-30 DIAGNOSIS — L60.0 INGROWN LEFT GREATER TOENAIL: Primary | ICD-10-CM

## 2024-01-30 PROCEDURE — G8417 CALC BMI ABV UP PARAM F/U: HCPCS | Performed by: PHYSICIAN ASSISTANT

## 2024-01-30 PROCEDURE — 1036F TOBACCO NON-USER: CPT | Performed by: PHYSICIAN ASSISTANT

## 2024-01-30 PROCEDURE — G8427 DOCREV CUR MEDS BY ELIG CLIN: HCPCS | Performed by: PHYSICIAN ASSISTANT

## 2024-01-30 PROCEDURE — G8484 FLU IMMUNIZE NO ADMIN: HCPCS | Performed by: PHYSICIAN ASSISTANT

## 2024-01-30 PROCEDURE — 99213 OFFICE O/P EST LOW 20 MIN: CPT | Performed by: PHYSICIAN ASSISTANT

## 2024-01-30 RX ORDER — ALBUTEROL SULFATE 90 UG/1
2 AEROSOL, METERED RESPIRATORY (INHALATION) EVERY 6 HOURS PRN
Qty: 18 G | Refills: 3 | Status: SHIPPED | OUTPATIENT
Start: 2024-01-30

## 2024-01-30 RX ORDER — FLUOXETINE 10 MG/1
10 CAPSULE ORAL DAILY
Qty: 30 CAPSULE | Refills: 3 | Status: SHIPPED | OUTPATIENT
Start: 2024-01-30

## 2024-01-30 NOTE — PROGRESS NOTES
Subjective  Isaiah M. Schildwachter, 23 y.o. male presents today with:  Chief Complaint   Patient presents with    Toe Pain     Patient presents today c/o pain on the big left toe. X 3 weeks.        HPI    Pt is here accompanied by his mother with c.o ingrown nail left great toe  Needs rx refill - request placed at check out - other chronic conditions were not discussed  Review of Systems   All other systems reviewed and are negative.        Past Medical History:   Diagnosis Date    ADHD     Anxiety     Asthma     Depression     Obstructive airway disease ([facility]) 01/2022    PFT good response with brochodilator    Oppositional defiant disorder      Past Surgical History:   Procedure Laterality Date    ADENOIDECTOMY      COLONOSCOPY N/A 9/1/2023    COLONOSCOPY DIAGNOSTIC performed by Brandon Long MD at [facility]    TONSILLECTOMY       Social History     Socioeconomic History    Marital status: Single     Spouse name: Not on file    Number of children: Not on file    Years of education: Not on file    Highest education level: Not on file   Occupational History    Occupation: high school   Tobacco Use    Smoking status: Never    Smokeless tobacco: Never   Vaping Use    Vaping Use: Never used   Substance and Sexual Activity    Alcohol use: Never    Drug use: Never    Sexual activity: Not on file   Other Topics Concern    Not on file   Social History Narrative    Not on file     Social Determinants of Health     Financial Resource Strain: Low Risk  (2/23/2023)    Overall Financial Resource Strain (CARDIA)     Difficulty of Paying Living Expenses: Not hard at all   Food Insecurity: Not on file (2/23/2023)   Transportation Needs: Unknown (2/23/2023)    PRAPARE - Transportation     Lack of Transportation (Medical): Not on file     Lack of Transportation (Non-Medical): No   Physical Activity: Not on file   Stress: Not on file   Social Connections: Not on file   Intimate Partner Violence: Not on file   Housing

## 2024-03-25 ENCOUNTER — OFFICE VISIT (OUTPATIENT)
Dept: FAMILY MEDICINE CLINIC | Age: 24
End: 2024-03-25
Payer: COMMERCIAL

## 2024-03-25 VITALS
DIASTOLIC BLOOD PRESSURE: 84 MMHG | WEIGHT: 229.8 LBS | SYSTOLIC BLOOD PRESSURE: 130 MMHG | TEMPERATURE: 97.6 F | HEART RATE: 88 BPM | OXYGEN SATURATION: 98 % | HEIGHT: 70 IN | BODY MASS INDEX: 32.9 KG/M2

## 2024-03-25 DIAGNOSIS — J44.9 OBSTRUCTIVE AIRWAY DISEASE (HCC): ICD-10-CM

## 2024-03-25 DIAGNOSIS — S99.922A TOE TRAUMA, LEFT, INITIAL ENCOUNTER: Primary | ICD-10-CM

## 2024-03-25 DIAGNOSIS — K58.9 IRRITABLE BOWEL SYNDROME, UNSPECIFIED TYPE: ICD-10-CM

## 2024-03-25 PROCEDURE — 99213 OFFICE O/P EST LOW 20 MIN: CPT | Performed by: FAMILY MEDICINE

## 2024-03-25 PROCEDURE — G8417 CALC BMI ABV UP PARAM F/U: HCPCS | Performed by: FAMILY MEDICINE

## 2024-03-25 PROCEDURE — G8484 FLU IMMUNIZE NO ADMIN: HCPCS | Performed by: FAMILY MEDICINE

## 2024-03-25 PROCEDURE — G8427 DOCREV CUR MEDS BY ELIG CLIN: HCPCS | Performed by: FAMILY MEDICINE

## 2024-03-25 PROCEDURE — 3023F SPIROM DOC REV: CPT | Performed by: FAMILY MEDICINE

## 2024-03-25 PROCEDURE — 1036F TOBACCO NON-USER: CPT | Performed by: FAMILY MEDICINE

## 2024-03-25 RX ORDER — FLUOXETINE 10 MG/1
10 CAPSULE ORAL DAILY
Qty: 90 CAPSULE | Refills: 3 | Status: SHIPPED | OUTPATIENT
Start: 2024-03-25

## 2024-03-25 RX ORDER — DICYCLOMINE HCL 20 MG
20 TABLET ORAL 3 TIMES DAILY PRN
Qty: 90 TABLET | Refills: 2 | Status: SHIPPED | OUTPATIENT
Start: 2024-03-25

## 2024-03-25 SDOH — ECONOMIC STABILITY: FOOD INSECURITY: WITHIN THE PAST 12 MONTHS, YOU WORRIED THAT YOUR FOOD WOULD RUN OUT BEFORE YOU GOT MONEY TO BUY MORE.: NEVER TRUE

## 2024-03-25 SDOH — ECONOMIC STABILITY: FOOD INSECURITY: WITHIN THE PAST 12 MONTHS, THE FOOD YOU BOUGHT JUST DIDN'T LAST AND YOU DIDN'T HAVE MONEY TO GET MORE.: NEVER TRUE

## 2024-03-25 SDOH — ECONOMIC STABILITY: INCOME INSECURITY: HOW HARD IS IT FOR YOU TO PAY FOR THE VERY BASICS LIKE FOOD, HOUSING, MEDICAL CARE, AND HEATING?: NOT HARD AT ALL

## 2024-03-25 NOTE — PROGRESS NOTES
on file     Lack of Transportation (Non-Medical): No   Physical Activity: Not on file   Stress: Not on file   Social Connections: Not on file   Intimate Partner Violence: Not on file   Housing Stability: Unknown (3/25/2024)    Housing Stability Vital Sign     Unable to Pay for Housing in the Last Year: Not on file     Number of Places Lived in the Last Year: Not on file     Unstable Housing in the Last Year: No     Family History   Problem Relation Age of Onset    Stroke Father     Stroke Maternal Grandmother     Stroke Maternal Grandfather     Colon Cancer Neg Hx      Allergies   Allergen Reactions    Peanut-Containing Drug Products Anaphylaxis    [place] [Macadamia Nut Oil]      Current Outpatient Medications   Medication Sig Dispense Refill    FLUoxetine (PROZAC) 10 MG capsule Take 1 capsule by mouth daily 30 capsule 3    albuterol sulfate HFA (PROVENTIL HFA) 108 (90 Base) MCG/ACT inhaler Inhale 2 puffs into the lungs every 6 hours as needed for Wheezing or Shortness of Breath Please cover what is under insurance. 18 g 3    famotidine (PEPCID) 40 MG tablet Take 0.5 tablets by mouth 2 times daily as needed (GERD) 30 tablet 3    naproxen (NAPROSYN) 500 MG tablet Take 1 tablet by mouth 2 times daily as needed for Pain Take with food 60 tablet 0    SUMAtriptan (IMITREX) 25 MG tablet Take 1 tablet by mouth daily as needed for Migraine 9 tablet 3    fluticasone-salmeterol (ADVAIR HFA) 115-21 MCG/ACT inhaler Inhale 2 puffs into the lungs 2 times daily 1 each 3    dicyclomine (BENTYL) 20 MG tablet Take 1 tablet by mouth 3 times daily as needed (abdominal pain) 90 tablet 2    EPINEPHrine (EPIPEN) 0.3 MG/0.3ML SOAJ injection Use as directed for allergic reaction 2 each 1     No current facility-administered medications for this visit.       PMH, Surgical Hx, Family Hx, and Social Hx reviewed and updated.  Health Maintenance reviewed.    Objective  Vitals:    03/25/24 1431   BP: 130/84   Pulse: 88   Temp: 97.6 °F (36.4 °C)

## 2025-06-24 ENCOUNTER — TELEPHONE (OUTPATIENT)
Age: 25
End: 2025-06-24

## 2025-06-24 NOTE — TELEPHONE ENCOUNTER
----- Message from Princess JONES sent at 6/24/2025  1:48 PM EDT -----  Regarding: [facility] Appointment Request  E[facility] Appointment Request    Patient needs appointment for E[facility] Appointment Type: Annual Visit.    Patient Requested Dates(s): July 1, 2025   Patient Requested Time: Mid afternoon   Provider Name:  Benita Ragsdale MD    Reason for Appointment Request: Established Patient - Available appointments did not meet patient need  --------------------------------------------------------------------------------------------------------------------------    Relationship to Patient: Spouse/Partner     Call Back Information: OK to leave message on voicemail  Preferred Call Back Number: [phone]

## 2025-07-24 ENCOUNTER — TELEPHONE (OUTPATIENT)
Age: 25
End: 2025-07-24

## 2025-07-24 NOTE — TELEPHONE ENCOUNTER
----- Message from Julieta JONES sent at 7/24/2025  9:08 AM EDT -----  Regarding: E[facility] Appointment Request  E[facility] Appointment Request    Patient needs appointment for E[facility] Appointment Type: Existing Condition Follow Up. Follow up for asthma    Patient Requested Dates(s): next week or two preferably Tuesday  Patient Requested Time: afternoon  Provider Name:Benita Ragsdale MD or any provider at the practice    Reason for Appointment Request: Established Patient - Available appointments did not meet patient need  --------------------------------------------------------------------------------------------------------------------------    Relationship to Patient: Jessica Astudillo     Call Back Information: OK to leave message on voicemail  Preferred Call Back Number: Phone

## 2025-08-05 ENCOUNTER — OFFICE VISIT (OUTPATIENT)
Age: 25
End: 2025-08-05
Payer: COMMERCIAL

## 2025-08-05 ENCOUNTER — TELEPHONE (OUTPATIENT)
Age: 25
End: 2025-08-05

## 2025-08-05 VITALS
HEIGHT: 70 IN | SYSTOLIC BLOOD PRESSURE: 124 MMHG | OXYGEN SATURATION: 96 % | HEART RATE: 82 BPM | BODY MASS INDEX: 29.06 KG/M2 | WEIGHT: 203 LBS | TEMPERATURE: 97.7 F | DIASTOLIC BLOOD PRESSURE: 62 MMHG

## 2025-08-05 DIAGNOSIS — K21.9 GASTROESOPHAGEAL REFLUX DISEASE WITHOUT ESOPHAGITIS: ICD-10-CM

## 2025-08-05 DIAGNOSIS — Z87.892 HISTORY OF ANAPHYLAXIS: ICD-10-CM

## 2025-08-05 DIAGNOSIS — R06.02 SHORTNESS OF BREATH: ICD-10-CM

## 2025-08-05 DIAGNOSIS — K58.9 IRRITABLE BOWEL SYNDROME, UNSPECIFIED TYPE: ICD-10-CM

## 2025-08-05 DIAGNOSIS — J44.9 OBSTRUCTIVE AIRWAY DISEASE (HCC): ICD-10-CM

## 2025-08-05 PROCEDURE — G8427 DOCREV CUR MEDS BY ELIG CLIN: HCPCS | Performed by: NURSE PRACTITIONER

## 2025-08-05 PROCEDURE — 99213 OFFICE O/P EST LOW 20 MIN: CPT | Performed by: NURSE PRACTITIONER

## 2025-08-05 PROCEDURE — 3023F SPIROM DOC REV: CPT | Performed by: NURSE PRACTITIONER

## 2025-08-05 PROCEDURE — G8419 CALC BMI OUT NRM PARAM NOF/U: HCPCS | Performed by: NURSE PRACTITIONER

## 2025-08-05 PROCEDURE — 99214 OFFICE O/P EST MOD 30 MIN: CPT | Performed by: NURSE PRACTITIONER

## 2025-08-05 PROCEDURE — 1036F TOBACCO NON-USER: CPT | Performed by: NURSE PRACTITIONER

## 2025-08-05 RX ORDER — ALBUTEROL SULFATE 90 UG/1
2 INHALANT RESPIRATORY (INHALATION) EVERY 6 HOURS PRN
Qty: 18 G | Refills: 3 | Status: SHIPPED | OUTPATIENT
Start: 2025-08-05

## 2025-08-05 RX ORDER — FAMOTIDINE 40 MG/1
20 TABLET, FILM COATED ORAL 2 TIMES DAILY PRN
Qty: 30 TABLET | Refills: 3 | Status: SHIPPED | OUTPATIENT
Start: 2025-08-05

## 2025-08-05 RX ORDER — EPINEPHRINE 0.3 MG/.3ML
INJECTION SUBCUTANEOUS
Qty: 2 EACH | Refills: 1 | Status: SHIPPED | OUTPATIENT
Start: 2025-08-05

## 2025-08-05 RX ORDER — DICYCLOMINE HCL 20 MG
20 TABLET ORAL 3 TIMES DAILY PRN
Qty: 90 TABLET | Refills: 2 | Status: SHIPPED | OUTPATIENT
Start: 2025-08-05

## 2025-08-05 RX ORDER — FLUTICASONE PROPIONATE AND SALMETEROL XINAFOATE 115; 21 UG/1; UG/1
2 AEROSOL, METERED RESPIRATORY (INHALATION) 2 TIMES DAILY
Qty: 1 EACH | Refills: 3 | Status: SHIPPED | OUTPATIENT
Start: 2025-08-05

## 2025-08-05 SDOH — ECONOMIC STABILITY: FOOD INSECURITY: WITHIN THE PAST 12 MONTHS, THE FOOD YOU BOUGHT JUST DIDN'T LAST AND YOU DIDN'T HAVE MONEY TO GET MORE.: NEVER TRUE

## 2025-08-05 SDOH — ECONOMIC STABILITY: FOOD INSECURITY: WITHIN THE PAST 12 MONTHS, YOU WORRIED THAT YOUR FOOD WOULD RUN OUT BEFORE YOU GOT MONEY TO BUY MORE.: NEVER TRUE

## 2025-08-05 ASSESSMENT — PATIENT HEALTH QUESTIONNAIRE - PHQ9
SUM OF ALL RESPONSES TO PHQ QUESTIONS 1-9: 0
1. LITTLE INTEREST OR PLEASURE IN DOING THINGS: NOT AT ALL
SUM OF ALL RESPONSES TO PHQ QUESTIONS 1-9: 0
2. FEELING DOWN, DEPRESSED OR HOPELESS: NOT AT ALL

## (undated) DEVICE — ENDO CARRY-ON PROCEDURE KIT: Brand: ENDO CARRY-ON PROCEDURE KIT

## (undated) DEVICE — TUBE SET 96 MM 64 MM H2O PERISTALTIC STD AUX CHANNEL

## (undated) DEVICE — BRUSH ENDO CLN L90.5IN SHTH DIA1.7MM BRIST DIA5-7MM 2-6MM

## (undated) DEVICE — TUBING, SUCTION, 1/4" X 10', STRAIGHT: Brand: MEDLINE

## (undated) DEVICE — SINGLE PORT MANIFOLD: Brand: NEPTUNE 2

## (undated) DEVICE — Device: Brand: ENDO SMARTCAP

## (undated) DEVICE — FORCEPS BX L240CM JAW DIA2.8MM L CAP W/ NDL MIC MESH TOOTH